# Patient Record
Sex: MALE | Race: WHITE | NOT HISPANIC OR LATINO | Employment: OTHER | URBAN - METROPOLITAN AREA
[De-identification: names, ages, dates, MRNs, and addresses within clinical notes are randomized per-mention and may not be internally consistent; named-entity substitution may affect disease eponyms.]

---

## 2018-08-15 ENCOUNTER — OFFICE VISIT CONVERTED (OUTPATIENT)
Dept: SURGERY | Facility: CLINIC | Age: 58
End: 2018-08-15
Attending: NURSE PRACTITIONER

## 2019-11-14 ENCOUNTER — OFFICE VISIT (OUTPATIENT)
Dept: FAMILY MEDICINE CLINIC | Facility: CLINIC | Age: 59
End: 2019-11-14

## 2019-11-14 VITALS
SYSTOLIC BLOOD PRESSURE: 131 MMHG | HEIGHT: 73 IN | WEIGHT: 260.8 LBS | RESPIRATION RATE: 18 BRPM | BODY MASS INDEX: 34.57 KG/M2 | DIASTOLIC BLOOD PRESSURE: 92 MMHG | TEMPERATURE: 98.1 F | HEART RATE: 65 BPM | OXYGEN SATURATION: 97 %

## 2019-11-14 DIAGNOSIS — R53.83 MALAISE AND FATIGUE: ICD-10-CM

## 2019-11-14 DIAGNOSIS — Z00.00 PHYSICAL EXAM: Primary | ICD-10-CM

## 2019-11-14 DIAGNOSIS — Z13.220 SCREENING FOR HYPERLIPIDEMIA: ICD-10-CM

## 2019-11-14 DIAGNOSIS — R51.9 FREQUENT HEADACHES: ICD-10-CM

## 2019-11-14 DIAGNOSIS — Z13.29 SCREENING FOR HYPOTHYROIDISM: ICD-10-CM

## 2019-11-14 DIAGNOSIS — R53.81 MALAISE AND FATIGUE: ICD-10-CM

## 2019-11-14 DIAGNOSIS — Z12.5 SCREENING FOR PROSTATE CANCER: ICD-10-CM

## 2019-11-14 PROCEDURE — 99386 PREV VISIT NEW AGE 40-64: CPT | Performed by: FAMILY MEDICINE

## 2019-11-14 RX ORDER — IBUPROFEN 800 MG/1
800 TABLET ORAL DAILY
COMMUNITY
End: 2020-01-17 | Stop reason: SDUPTHER

## 2019-11-14 RX ORDER — IBUPROFEN 200 MG
TABLET ORAL DAILY
COMMUNITY
End: 2019-11-14

## 2019-11-14 RX ORDER — SILDENAFIL CITRATE 20 MG/1
20 TABLET ORAL DAILY PRN
Refills: 5 | COMMUNITY
Start: 2019-11-11 | End: 2021-12-01 | Stop reason: SDUPTHER

## 2019-11-14 NOTE — PROGRESS NOTES
Chief Complaint   Patient presents with   • Annual Exam   • Headache       History of Present Illness:  Subjective   Kip Oliveira is a 59 y.o. male here for his annual physical with me. Kip is here for coordination of medical care, to discuss health maintenance, disease prevention as well as to followup on medical problems. Patient is here today as a new patient physical. Patient's last CPE was Unknown . Activity level is moderate. 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 Appetite is good. He has been on the KETO diet since 2018.Feels well with few complaints. Energy level is fair. He states that he is fatigues some and he states that there are times he has less energy. Sleeps well. Patient's last colonoscopy was 9 years ago and he had few pollups. He is advised to repeat in 10 years.  Patient is doing routine self skin exam monthly. Patient states that he has also been dealing with some headaches. He states that he has been having a lot of stress in his life here lately and he states that he has a lot going on. 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 He states that the headaches he is getting is daily and he states that worse in the last 3-4 weeks. He states that he had Liassic surgery years ago and he states that he has not seen a eye doctor in 13-14 years and he states that he can tell his vision has changed some. He states that besides these things he feels he is a healthy man.      Allergies:  Allergies   Allergen Reactions   • Sulfa Antibiotics Anaphylaxis     High fevers and heart issues        • Keflex [Cephalexin] Itching   • Motrin [Ibuprofen] Itching     Itchy throat          Social History:  Social History     Socioeconomic History   • Marital status:      Spouse name: Not on file   • Number of children: Not on file   • Years  of education: Not on file   • Highest education level: Not on file   Tobacco Use   • Smoking status: Never Smoker   • Smokeless tobacco: Never Used   Substance and Sexual Activity   • Alcohol use: No     Frequency: Never   • Drug use: No       Family History:  Family History   Problem Relation Age of Onset   • Cancer Mother    • Cancer Father        Past Medical History :  Active Ambulatory Problems     Diagnosis Date Noted   • Physical exam 11/14/2019   • Frequent headaches 11/15/2019     Resolved Ambulatory Problems     Diagnosis Date Noted   • No Resolved Ambulatory Problems     Past Medical History:   Diagnosis Date   • GERD (gastroesophageal reflux disease)    • Hyperlipidemia        Medication List:    Current Outpatient Medications:   •  ibuprofen (ADVIL,MOTRIN) 800 MG tablet, Take 800 mg by mouth Daily., Disp: , Rfl:   •  sildenafil (REVATIO) 20 MG tablet, Take 20 mg by mouth Daily As Needed., Disp: , Rfl: 5    Past Surgical History:  Past Surgical History:   Procedure Laterality Date   • COLONOSCOPY     • VENOUS ABLATION          The following portions of the patient's history were reviewed and updated as appropriate: allergies, current medications, past family history, past medical history, past social history, past surgical history and problem list.    Review Of Systems:  Review of Systems   Constitutional: Positive for fatigue. Negative for activity change and appetite change.   HENT: Negative for congestion, postnasal drip, sinus pressure and sore throat.    Eyes: Negative for blurred vision and itching.   Respiratory: Negative for cough, shortness of breath and wheezing.    Cardiovascular: Negative for chest pain.   Gastrointestinal: Negative for abdominal pain, constipation, nausea, vomiting and GERD.   Endocrine: Negative for cold intolerance and heat intolerance.   Genitourinary: Negative for difficulty urinating, dysuria and urinary incontinence.   Musculoskeletal: Negative for back pain, joint  "swelling and neck pain.   Skin: Negative for color change and rash.   Neurological: Negative for dizziness, speech difficulty, weakness and memory problem.   Psychiatric/Behavioral: Negative for behavioral problems, decreased concentration, suicidal ideas and depressed mood.       Physical Exam:  Vital Signs:  Vitals:    11/14/19 1434   BP: 131/92   Pulse: 65   Resp: 18   Temp: 98.1 °F (36.7 °C)   SpO2: 97%   Weight: 118 kg (260 lb 12.8 oz)   Height: 185.4 cm (73\")     Body mass index is 34.41 kg/m².    Physical Exam   Constitutional: He is oriented to person, place, and time. He appears well-developed and well-nourished. He is active.   HENT:   Head: Normocephalic and atraumatic.   Nose: Nose normal.   Eyes: Conjunctivae and lids are normal. Pupils are equal, round, and reactive to light.   Neck: Normal range of motion. Neck supple.   Cardiovascular: Normal rate, regular rhythm, normal heart sounds and normal pulses.   Pulmonary/Chest: Effort normal and breath sounds normal. No respiratory distress.   Abdominal: Soft. Bowel sounds are normal.   Musculoskeletal: Normal range of motion.   Neurological: He is alert and oriented to person, place, and time. He has normal strength and normal reflexes. He displays normal reflexes. No cranial nerve deficit or sensory deficit. Coordination normal.   Skin: Skin is warm and dry. Capillary refill takes less than 2 seconds.   Psychiatric: He has a normal mood and affect. His behavior is normal. Judgment and thought content normal.   Vitals reviewed.        Assessment and Plan:  Diagnoses and all orders for this visit:    1. Physical exam (Primary)  Assessment & Plan:  Discussed injury prevention, diet and exercise, safe sexual practices, and screening for common diseases. Encouraged use of sunscreen and seatbelts. Avoidance of tobacco encouraged. Limitation or avoidance of alcohol encouraged. Recommend yearly dental and eye exams. Also discussed monitoring of blood pressure, " lipids.      2. Frequent headaches  Assessment & Plan:  He takes NSAIDs to treat his symptoms.   He is currently stable.       3. Screening for hyperlipidemia  -     Cancel: CBC Auto Differential  -     Cancel: Lipid Panel With / Chol / HDL Ratio  -     Cancel: Comprehensive Metabolic Panel  -     Comprehensive Metabolic Panel; Future  -     Lipid Panel With / Chol / HDL Ratio; Future  -     CBC Auto Differential; Future    4. Screening for prostate cancer  -     Cancel: PSA Screen  -     PSA Screen; Future    5. Screening for hypothyroidism  -     Cancel: TSH  -     TSH; Future    6. Malaise and fatigue  -     Testosterone (Free & Total), LC / MS; Future  -     Vitamin D 25 Hydroxy; Future  -     Vitamin B12; Future

## 2019-11-15 PROBLEM — R51.9 FREQUENT HEADACHES: Status: ACTIVE | Noted: 2019-11-15

## 2019-11-21 ENCOUNTER — RESULTS ENCOUNTER (OUTPATIENT)
Dept: FAMILY MEDICINE CLINIC | Facility: CLINIC | Age: 59
End: 2019-11-21

## 2019-11-21 DIAGNOSIS — Z13.29 SCREENING FOR HYPOTHYROIDISM: ICD-10-CM

## 2019-11-21 DIAGNOSIS — Z12.5 SCREENING FOR PROSTATE CANCER: ICD-10-CM

## 2019-11-21 DIAGNOSIS — Z13.220 SCREENING FOR HYPERLIPIDEMIA: ICD-10-CM

## 2019-11-21 DIAGNOSIS — R53.83 MALAISE AND FATIGUE: ICD-10-CM

## 2019-11-21 DIAGNOSIS — R53.81 MALAISE AND FATIGUE: ICD-10-CM

## 2020-01-06 LAB
25(OH)D3+25(OH)D2 SERPL-MCNC: 25.1 NG/ML (ref 30–100)
ALBUMIN SERPL-MCNC: 4.1 G/DL (ref 3.5–5.5)
ALBUMIN/GLOB SERPL: 1.5 {RATIO} (ref 1.2–2.2)
ALP SERPL-CCNC: 98 IU/L (ref 39–117)
ALT SERPL-CCNC: 22 IU/L (ref 0–44)
AST SERPL-CCNC: 20 IU/L (ref 0–40)
BASOPHILS # BLD AUTO: 0.1 X10E3/UL (ref 0–0.2)
BASOPHILS NFR BLD AUTO: 1 %
BILIRUB SERPL-MCNC: 0.5 MG/DL (ref 0–1.2)
BUN SERPL-MCNC: 21 MG/DL (ref 6–24)
BUN/CREAT SERPL: 22 (ref 9–20)
CALCIUM SERPL-MCNC: 9.5 MG/DL (ref 8.7–10.2)
CHLORIDE SERPL-SCNC: 100 MMOL/L (ref 96–106)
CHOLEST SERPL-MCNC: 399 MG/DL (ref 100–199)
CHOLEST/HDLC SERPL: 5.8 RATIO (ref 0–5)
CO2 SERPL-SCNC: 25 MMOL/L (ref 20–29)
CREAT SERPL-MCNC: 0.97 MG/DL (ref 0.76–1.27)
EOSINOPHIL # BLD AUTO: 0.2 X10E3/UL (ref 0–0.4)
EOSINOPHIL NFR BLD AUTO: 3 %
ERYTHROCYTE [DISTWIDTH] IN BLOOD BY AUTOMATED COUNT: 14.1 % (ref 12.3–15.4)
GLOBULIN SER CALC-MCNC: 2.8 G/DL (ref 1.5–4.5)
GLUCOSE SERPL-MCNC: 97 MG/DL (ref 65–99)
HCT VFR BLD AUTO: 42.6 % (ref 37.5–51)
HDLC SERPL-MCNC: 69 MG/DL
HGB BLD-MCNC: 14.6 G/DL (ref 13–17.7)
IMM GRANULOCYTES # BLD AUTO: 0 X10E3/UL (ref 0–0.1)
IMM GRANULOCYTES NFR BLD AUTO: 0 %
LABORATORY COMMENT REPORT: ABNORMAL
LDLC SERPL CALC-MCNC: 311 MG/DL (ref 0–99)
LYMPHOCYTES # BLD AUTO: 2.8 X10E3/UL (ref 0.7–3.1)
LYMPHOCYTES NFR BLD AUTO: 40 %
MCH RBC QN AUTO: 30.7 PG (ref 26.6–33)
MCHC RBC AUTO-ENTMCNC: 34.3 G/DL (ref 31.5–35.7)
MCV RBC AUTO: 90 FL (ref 79–97)
MONOCYTES # BLD AUTO: 0.6 X10E3/UL (ref 0.1–0.9)
MONOCYTES NFR BLD AUTO: 8 %
NEUTROPHILS # BLD AUTO: 3.3 X10E3/UL (ref 1.4–7)
NEUTROPHILS NFR BLD AUTO: 48 %
PLATELET # BLD AUTO: 275 X10E3/UL (ref 150–450)
POTASSIUM SERPL-SCNC: 4.8 MMOL/L (ref 3.5–5.2)
PROT SERPL-MCNC: 6.9 G/DL (ref 6–8.5)
PSA SERPL-MCNC: 0.6 NG/ML (ref 0–4)
RBC # BLD AUTO: 4.76 X10E6/UL (ref 4.14–5.8)
SODIUM SERPL-SCNC: 137 MMOL/L (ref 134–144)
TESTOST FREE SERPL-MCNC: 9.6 PG/ML (ref 7.2–24)
TESTOST SERPL-MCNC: 718.8 NG/DL (ref 264–916)
TRIGL SERPL-MCNC: 93 MG/DL (ref 0–149)
TSH SERPL DL<=0.005 MIU/L-ACNC: 2.04 UIU/ML (ref 0.45–4.5)
VIT B12 SERPL-MCNC: 817 PG/ML (ref 232–1245)
VLDLC SERPL CALC-MCNC: 19 MG/DL (ref 5–40)
WBC # BLD AUTO: 7 X10E3/UL (ref 3.4–10.8)

## 2020-01-09 ENCOUNTER — TELEPHONE (OUTPATIENT)
Dept: FAMILY MEDICINE CLINIC | Facility: CLINIC | Age: 60
End: 2020-01-09

## 2020-01-09 NOTE — TELEPHONE ENCOUNTER
I called the patient and left a voicemail for him to call and get an appointment with us for  A lab follow up due to elevated Lipid panel.     He needs to schedule a follow up when he calls back.

## 2020-01-13 ENCOUNTER — OFFICE VISIT (OUTPATIENT)
Dept: FAMILY MEDICINE CLINIC | Facility: CLINIC | Age: 60
End: 2020-01-13

## 2020-01-13 VITALS
RESPIRATION RATE: 18 BRPM | WEIGHT: 267.4 LBS | HEIGHT: 73 IN | TEMPERATURE: 98.2 F | BODY MASS INDEX: 35.44 KG/M2 | SYSTOLIC BLOOD PRESSURE: 131 MMHG | OXYGEN SATURATION: 98 % | HEART RATE: 63 BPM | DIASTOLIC BLOOD PRESSURE: 77 MMHG

## 2020-01-13 DIAGNOSIS — E55.9 VITAMIN D DEFICIENCY: ICD-10-CM

## 2020-01-13 DIAGNOSIS — E78.2 MIXED HYPERLIPIDEMIA: Primary | ICD-10-CM

## 2020-01-13 PROBLEM — I47.1 SUPRAVENTRICULAR TACHYCARDIA (HCC): Status: ACTIVE | Noted: 2020-01-13

## 2020-01-13 PROBLEM — I47.10 SUPRAVENTRICULAR TACHYCARDIA: Status: ACTIVE | Noted: 2020-01-13

## 2020-01-13 PROBLEM — E78.5 HYPERLIPIDEMIA: Status: ACTIVE | Noted: 2020-01-13

## 2020-01-13 PROCEDURE — 99214 OFFICE O/P EST MOD 30 MIN: CPT | Performed by: FAMILY MEDICINE

## 2020-01-13 NOTE — PROGRESS NOTES
Chief Complaint   Patient presents with   • Hyperlipidemia     Lab review    • Vitamin D Deficiency     Lab review        History of Present Illness:  Subjective   Kip Oliveira is a 59 y.o. male.   Hyperlipidemia   This is a chronic problem. The current episode started more than 1 year ago. The problem is uncontrolled. Recent lipid tests were reviewed and are high. He has no history of chronic renal disease, diabetes, hypothyroidism, liver disease, obesity or nephrotic syndrome. Factors aggravating his hyperlipidemia include thiazides. Pertinent negatives include no chest pain, focal sensory loss, focal weakness, leg pain, myalgias or shortness of breath. (Patient is here today and states that he has always had an issue with his Cholesterol as he states that he has always had higher readings but he states that he has quit taking the medication as he states that he could not find a medication that he could take that would not mess with his joints and make him hurt all over. He states that when he was taking the medication he was miserable and he would hurt all over. ) Current antihyperlipidemic treatment includes diet change and statins. The current treatment provides mild improvement of lipids. Risk factors for coronary artery disease include family history, male sex and obesity.        Allergies:  Allergies   Allergen Reactions   • Sulfa Antibiotics Anaphylaxis     High fevers and heart issues        • Naproxen Sodium Itching   • Keflex [Cephalexin] Itching   • Motrin [Ibuprofen] Itching     Itchy throat          Social History:  Social History     Socioeconomic History   • Marital status:      Spouse name: Not on file   • Number of children: Not on file   • Years of education: Not on file   • Highest education level: Not on file   Tobacco Use   • Smoking status: Never Smoker   • Smokeless tobacco: Never Used   Substance and Sexual Activity   • Alcohol use: No     Frequency: Never   • Drug use: No        Family History:  Family History   Problem Relation Age of Onset   • Cancer Mother    • Cancer Father        Past Medical History :  Active Ambulatory Problems     Diagnosis Date Noted   • Physical exam 11/14/2019   • Frequent headaches 11/15/2019   • Chest pain 01/29/2014   • Hyperlipidemia 01/13/2020   • Palpitations 01/29/2014   • Supraventricular tachycardia (CMS/HCC) 01/13/2020   • Vitamin D deficiency 01/13/2020     Resolved Ambulatory Problems     Diagnosis Date Noted   • No Resolved Ambulatory Problems     Past Medical History:   Diagnosis Date   • GERD (gastroesophageal reflux disease)        Medication List:    Current Outpatient Medications:   •  sildenafil (REVATIO) 20 MG tablet, Take 20 mg by mouth Daily As Needed., Disp: , Rfl: 5  •  ibuprofen (ADVIL,MOTRIN) 800 MG tablet, Take 1 tablet by mouth Every 8 (Eight) Hours As Needed for Mild Pain ., Disp: 60 tablet, Rfl: 1    Past Surgical History:  Past Surgical History:   Procedure Laterality Date   • COLONOSCOPY     • VENOUS ABLATION          The following portions of the patient's history were reviewed and updated as appropriate: allergies, current medications, past family history, past medical history, past social history, past surgical history and problem list.    Review Of Systems:  Review of Systems   Constitutional: Negative for activity change, appetite change and fatigue.   HENT: Negative for congestion, postnasal drip, sinus pressure and sore throat.    Eyes: Negative for blurred vision and itching.   Respiratory: Negative for cough, shortness of breath and wheezing.    Cardiovascular: Negative for chest pain.   Gastrointestinal: Negative for abdominal pain, constipation, nausea, vomiting and GERD.   Endocrine: Negative for cold intolerance and heat intolerance.   Genitourinary: Negative for difficulty urinating, dysuria and urinary incontinence.   Musculoskeletal: Negative for back pain, joint swelling, myalgias and neck pain.   Skin:  "Negative for color change and rash.   Neurological: Negative for dizziness, focal weakness, speech difficulty, weakness and memory problem.   Psychiatric/Behavioral: Negative for behavioral problems, decreased concentration, suicidal ideas and depressed mood.       Physical Exam:  Vital Signs:  Vitals:    01/13/20 1518   BP: 131/77   Pulse: 63   Resp: 18   Temp: 98.2 °F (36.8 °C)   SpO2: 98%   Weight: 121 kg (267 lb 6.4 oz)   Height: 185.4 cm (73\")     Body mass index is 35.28 kg/m².    Physical Exam   Constitutional: He is oriented to person, place, and time. He appears well-developed and well-nourished.   HENT:   Head: Normocephalic.   Right Ear: External ear normal.   Left Ear: External ear normal.   Nose: Nose normal.   Eyes: Conjunctivae are normal.   Neck: Normal range of motion. Neck supple.   Cardiovascular: Normal rate and regular rhythm.   Pulmonary/Chest: Effort normal and breath sounds normal.   Musculoskeletal: Normal range of motion.   Neurological: He is alert and oriented to person, place, and time.   Skin: Skin is warm and dry. Capillary refill takes less than 2 seconds.   Vitals reviewed.      Recent Results (from the past 1344 hour(s))   CBC & Differential    Collection Time: 01/02/20  8:16 AM   Result Value Ref Range    WBC 7.0 3.4 - 10.8 x10E3/uL    RBC 4.76 4.14 - 5.80 x10E6/uL    Hemoglobin 14.6 13.0 - 17.7 g/dL    Hematocrit 42.6 37.5 - 51.0 %    MCV 90 79 - 97 fL    MCH 30.7 26.6 - 33.0 pg    MCHC 34.3 31.5 - 35.7 g/dL    RDW 14.1 12.3 - 15.4 %    Platelets 275 150 - 450 x10E3/uL    Neutrophil Rel % 48 Not Estab. %    Lymphocyte Rel % 40 Not Estab. %    Monocyte Rel % 8 Not Estab. %    Eosinophil Rel % 3 Not Estab. %    Basophil Rel % 1 Not Estab. %    Neutrophils Absolute 3.3 1.4 - 7.0 x10E3/uL    Lymphocytes Absolute 2.8 0.7 - 3.1 x10E3/uL    Monocytes Absolute 0.6 0.1 - 0.9 x10E3/uL    Eosinophils Absolute 0.2 0.0 - 0.4 x10E3/uL    Basophils Absolute 0.1 0.0 - 0.2 x10E3/uL    Immature " Granulocyte Rel % 0 Not Estab. %    Immature Grans Absolute 0.0 0.0 - 0.1 x10E3/uL   Comprehensive Metabolic Panel    Collection Time: 01/02/20  8:16 AM   Result Value Ref Range    Glucose 97 65 - 99 mg/dL    BUN 21 6 - 24 mg/dL    Creatinine 0.97 0.76 - 1.27 mg/dL    eGFR Non African Am 85 >59 mL/min/1.73    eGFR African Am 98 >59 mL/min/1.73    BUN/Creatinine Ratio 22 (H) 9 - 20    Sodium 137 134 - 144 mmol/L    Potassium 4.8 3.5 - 5.2 mmol/L    Chloride 100 96 - 106 mmol/L    Total CO2 25 20 - 29 mmol/L    Calcium 9.5 8.7 - 10.2 mg/dL    Total Protein 6.9 6.0 - 8.5 g/dL    Albumin 4.1 3.5 - 5.5 g/dL    Globulin 2.8 1.5 - 4.5 g/dL    A/G Ratio 1.5 1.2 - 2.2    Total Bilirubin 0.5 0.0 - 1.2 mg/dL    Alkaline Phosphatase 98 39 - 117 IU/L    AST (SGOT) 20 0 - 40 IU/L    ALT (SGPT) 22 0 - 44 IU/L   Lipid Panel With / Chol / HDL Ratio    Collection Time: 01/02/20  8:16 AM   Result Value Ref Range    Total Cholesterol 399 (H) 100 - 199 mg/dL    Triglycerides 93 0 - 149 mg/dL    HDL Cholesterol 69 >39 mg/dL    VLDL Cholesterol 19 5 - 40 mg/dL    LDL Cholesterol  311 (H) 0 - 99 mg/dL    Comment Comment     Chol/HDL Ratio 5.8 (H) 0.0 - 5.0 ratio   Testosterone (Free & Total), LC / MS    Collection Time: 01/02/20  8:16 AM   Result Value Ref Range    Testosterone, Total 718.8 264.0 - 916.0 ng/dL    Testosterone, Free 9.6 7.2 - 24.0 pg/mL   TSH    Collection Time: 01/02/20  8:16 AM   Result Value Ref Range    TSH 2.040 0.450 - 4.500 uIU/mL   PSA DIAGNOSTIC    Collection Time: 01/02/20  8:16 AM   Result Value Ref Range    PSA 0.6 0.0 - 4.0 ng/mL   Vitamin D 25 Hydroxy    Collection Time: 01/02/20  8:16 AM   Result Value Ref Range    25 Hydroxy, Vitamin D 25.1 (L) 30.0 - 100.0 ng/mL   Vitamin B12    Collection Time: 01/02/20  8:16 AM   Result Value Ref Range    Vitamin B-12 817 232 - 1,245 pg/mL       Assessment and Plan:  Diagnoses and all orders for this visit:    1. Mixed hyperlipidemia (Primary)  Assessment & Plan:  Lipid  abnormalities are worsening.  Nutritional counseling was provided. and Pharmacotherapy as ordered.  Lipids will be reassessed in 3 months.    Orders:  -     Lipid panel; Future    2. Vitamin D deficiency  Assessment & Plan:  Patient was advised to use over-the-counter vitamin D 5000 units.  We will recheck in 3 months.

## 2020-01-14 ENCOUNTER — RESULTS ENCOUNTER (OUTPATIENT)
Dept: FAMILY MEDICINE CLINIC | Facility: CLINIC | Age: 60
End: 2020-01-14

## 2020-01-14 DIAGNOSIS — E78.2 MIXED HYPERLIPIDEMIA: ICD-10-CM

## 2020-01-16 ENCOUNTER — TELEPHONE (OUTPATIENT)
Dept: FAMILY MEDICINE CLINIC | Facility: CLINIC | Age: 60
End: 2020-01-16

## 2020-01-16 DIAGNOSIS — R51.9 FREQUENT HEADACHES: Primary | ICD-10-CM

## 2020-01-16 RX ORDER — IBUPROFEN 800 MG/1
800 TABLET ORAL DAILY
Status: CANCELLED | OUTPATIENT
Start: 2020-01-16

## 2020-01-17 ENCOUNTER — TELEPHONE (OUTPATIENT)
Dept: FAMILY MEDICINE CLINIC | Facility: CLINIC | Age: 60
End: 2020-01-17

## 2020-01-17 RX ORDER — IBUPROFEN 800 MG/1
800 TABLET ORAL EVERY 8 HOURS PRN
Qty: 60 TABLET | Refills: 1 | Status: SHIPPED | OUTPATIENT
Start: 2020-01-17 | End: 2020-05-11

## 2020-05-10 DIAGNOSIS — R51.9 FREQUENT HEADACHES: ICD-10-CM

## 2020-05-11 RX ORDER — IBUPROFEN 800 MG/1
TABLET ORAL
Qty: 60 TABLET | Refills: 1 | Status: SHIPPED | OUTPATIENT
Start: 2020-05-11 | End: 2022-09-21 | Stop reason: SDUPTHER

## 2021-05-16 VITALS — WEIGHT: 271 LBS | BODY MASS INDEX: 37.94 KG/M2 | RESPIRATION RATE: 16 BRPM | HEIGHT: 71 IN

## 2021-08-17 ENCOUNTER — OFFICE VISIT (OUTPATIENT)
Dept: FAMILY MEDICINE CLINIC | Facility: CLINIC | Age: 61
End: 2021-08-17

## 2021-08-17 VITALS
BODY MASS INDEX: 40.88 KG/M2 | OXYGEN SATURATION: 94 % | WEIGHT: 276 LBS | HEIGHT: 69 IN | HEART RATE: 71 BPM | DIASTOLIC BLOOD PRESSURE: 90 MMHG | TEMPERATURE: 97 F | SYSTOLIC BLOOD PRESSURE: 164 MMHG

## 2021-08-17 DIAGNOSIS — Z12.11 SCREENING FOR COLON CANCER: ICD-10-CM

## 2021-08-17 DIAGNOSIS — B35.4 TINEA CORPORIS: Primary | ICD-10-CM

## 2021-08-17 DIAGNOSIS — E78.2 MIXED HYPERLIPIDEMIA: ICD-10-CM

## 2021-08-17 DIAGNOSIS — R03.0 ELEVATED BLOOD PRESSURE READING IN OFFICE WITHOUT DIAGNOSIS OF HYPERTENSION: ICD-10-CM

## 2021-08-17 PROBLEM — Z87.39 HISTORY OF GOUT: Status: ACTIVE | Noted: 2021-08-17

## 2021-08-17 PROBLEM — Z00.00 PHYSICAL EXAM: Status: RESOLVED | Noted: 2019-11-14 | Resolved: 2021-08-17

## 2021-08-17 PROCEDURE — 99204 OFFICE O/P NEW MOD 45 MIN: CPT | Performed by: NURSE PRACTITIONER

## 2021-08-17 RX ORDER — EZETIMIBE 10 MG/1
10 TABLET ORAL DAILY
Qty: 90 TABLET | Refills: 1 | Status: SHIPPED | OUTPATIENT
Start: 2021-08-17 | End: 2022-03-01 | Stop reason: SDUPTHER

## 2021-08-17 RX ORDER — PANTOPRAZOLE SODIUM 40 MG/1
40 TABLET, DELAYED RELEASE ORAL DAILY
COMMUNITY
Start: 2021-06-16 | End: 2022-09-19 | Stop reason: SDUPTHER

## 2021-08-17 RX ORDER — TERBINAFINE HYDROCHLORIDE 250 MG/1
250 TABLET ORAL DAILY
Qty: 14 TABLET | Refills: 0 | Status: SHIPPED | OUTPATIENT
Start: 2021-08-17 | End: 2021-08-31 | Stop reason: SDUPTHER

## 2021-08-17 RX ORDER — ALLOPURINOL 100 MG/1
100 TABLET ORAL DAILY
COMMUNITY
Start: 2021-06-24 | End: 2022-09-19 | Stop reason: SDUPTHER

## 2021-08-17 RX ORDER — TERBINAFINE HYDROCHLORIDE 250 MG/1
250 TABLET ORAL DAILY
Qty: 14 TABLET | Refills: 0 | Status: SHIPPED | OUTPATIENT
Start: 2021-08-17 | End: 2021-08-17

## 2021-08-17 NOTE — PROGRESS NOTES
Chief Complaint  Rash (thinks it might be ring worm )    Subjective            Kip Oliveira presents to [facility]  History of Present Illness     Establish care.  His current PCP is Dr. Roblero in [place], [place].  Prior to that it was Dr. Willis, who is now retired.    He reports for the past 3 to 4 weeks he has been using several over-the-counter antifungal creams to treat what he suspects is ringworm on the inner aspect of his left thigh.  Despite using the over-the-counter medications several times per day the area is worsening.  He did bring in pictures of the medications that he has been using, which include clotrimazole, Lamisil, miconazole, and Tinactin.    He has noticed similar rashes sporadically on the inner aspect of his left upper extremity, and on the left hand.  Neither of those are present today.    Blood pressure is elevated on exam today at 164/90.  Patient denies any history of hypertension.  He states his blood pressure is normally very good.  He states he has lost approximately 80 pounds following a keto diet.  His only health issue, in his opinion, is his cholesterol.  He last had labs with his PCP in June, and brought a copy of them with him today.  CBC and CMP are unremarkable.  His lipids do show an elevated cholesterol and LDL.  He is intolerant to statins, noting that they cause him muscle cramping.  He is tolerant of Zetia but notes that it is expensive through his insurance.  He has not tried getting it filled with good Rx.  He currently denies any chest pain or palpitations.  No shortness of breath or wheezing.  No swelling in the legs.    He last had a colonoscopy approximately 10 to 11 years ago.  After having the colonoscopy he had trouble with hemorrhoids for almost 6 years.  He states his colonoscopy was normal and they recommended a 10-year recall.  He does not wish to have a colonoscopy at this time, but [facility] is [facility]  Currently  "denies any constipation, diarrhea, rectal bleeding, or blood in his stool.    Past Medical History:   Diagnosis Date   • GERD (gastroesophageal reflux disease)    • Hyperlipidemia        Allergies   Allergen Reactions   • Sulfa Antibiotics Anaphylaxis     High fevers and heart issues        • Naproxen Sodium Itching   • Keflex [Cephalexin] Itching        Past Surgical History:   Procedure Laterality Date   • COLONOSCOPY     • VENOUS ABLATION          Social History     Tobacco Use   • Smoking status: Never Smoker   • Smokeless tobacco: Never Used   Substance Use Topics   • Alcohol use: No   • Drug use: No       Family History   Problem Relation Age of Onset   • Cancer Mother    • Cancer Father         Health Maintenance Due   Topic Date Due   • COLORECTAL CANCER SCREENING  Never done   • COVID-19 Vaccine (1) Never done   • TDAP/TD VACCINES (1 - Tdap) Never done   • ZOSTER VACCINE (1 of 2) Never done   • HEPATITIS C SCREENING  Never done   • ANNUAL PHYSICAL  11/15/2020        Current Outpatient Medications on File Prior to Visit   Medication Sig   • allopurinol (ZYLOPRIM) 100 MG tablet Take 100 mg by mouth Daily.   • ibuprofen (ADVIL,MOTRIN) 800 MG tablet TAKE ONE TABLET BY MOUTH EVERY 8 HOURS AS NEEDED FOR MILD PAIN   • pantoprazole (PROTONIX) 40 MG EC tablet Take 40 mg by mouth Daily.   • sildenafil (REVATIO) 20 MG tablet Take 20 mg by mouth Daily As Needed.     No current facility-administered medications on file prior to visit.       There is no immunization history on file for this patient.    Review of Systems     Objective     /90   Pulse 71   Temp 97 °F (36.1 °C)   Ht 175.3 cm (69\")   Wt 125 kg (276 lb)   SpO2 94%   BMI 40.76 kg/m²       Physical Exam  Vitals reviewed.   Constitutional:       General: He is not in acute distress.     Appearance: Normal appearance. He is well-developed. He is obese.   HENT:      Head: Normocephalic and atraumatic.   Eyes:      General: No scleral icterus.     " Conjunctiva/sclera: Conjunctivae normal.      Pupils: Pupils are equal, round, and reactive to light.   Neck:      Thyroid: No thyroid mass, thyromegaly or thyroid tenderness.      Vascular: No carotid bruit.      Trachea: Trachea normal.   Cardiovascular:      Rate and Rhythm: Normal rate and regular rhythm.      Pulses: Normal pulses.      Heart sounds: No murmur heard.     Pulmonary:      Effort: Pulmonary effort is normal.      Breath sounds: Normal breath sounds. No wheezing or rhonchi.   Musculoskeletal:         General: Normal range of motion.      Cervical back: Normal range of motion and neck supple.      Right lower leg: No edema.      Left lower leg: No edema.   Lymphadenopathy:      Cervical: No cervical adenopathy.   Skin:     General: Skin is warm and dry.      Comments: On the inner aspect of the left thigh there is a 110mm x 110mm area of erythema noted with active border - there is another area of erythema and active border within this area, which measures approx. 60mm x 60mm - There is central clearing present as well.    Neurological:      Mental Status: He is alert and oriented to person, place, and time.   Psychiatric:         Mood and Affect: Mood and affect normal.         Behavior: Behavior normal.         Thought Content: Thought content normal.         Judgment: Judgment normal.       Result Review :     The following data was reviewed by: GABI Echeverria on 08/17/2021:    Outside labs 6/17/2021:  CBC - NL  CMP - NL, including LFTs  Lipids - abnl - cholesterol 435, Trig 109, HDL 66,   Hgb A1C 5.5%  PSA normal   Uric acid normal                  Assessment and Plan      Diagnoses and all orders for this visit:    1. Tinea corporis (Primary)  -     Discontinue: terbinafine (lamiSIL) 250 MG tablet; Take 1 tablet by mouth Daily.  Dispense: 14 tablet; Refill: 0  -     terbinafine (lamiSIL) 250 MG tablet; Take 1 tablet by mouth Daily.  Dispense: 14 tablet; Refill: 0    2.  Screening for colon cancer  -     Cologuard - Stool, Per Rectum; Future    3. Mixed hyperlipidemia  -     ezetimibe (ZETIA) 10 MG tablet; Take 1 tablet by mouth Daily.  Dispense: 90 tablet; Refill: 1    4. Elevated blood pressure reading in office without diagnosis of hypertension        Follow Up     Return in about 3 months (around 11/17/2021) for recheck - CMP/fasting lipid profile - follow up Zetia.  Encouraged annual physical exam.     Follow up sooner if no improvement with oral antifungal.     Patient was given instructions and counseling regarding his condition or for health maintenance advice. Please see specific information pulled into the AVS if appropriate.     Kip Oliveira  reports that he has never smoked. He has never used smokeless tobacco.

## 2021-08-17 NOTE — PATIENT INSTRUCTIONS
Body Ringworm  Body ringworm is an infection of the skin that often causes a ring-shaped rash. Body ringworm is also called tinea corporis.  Body ringworm can affect any part of your skin. This condition is easily spread from person to person (is very contagious).  What are the causes?  This condition is caused by fungi called dermatophytes. The condition develops when these fungi grow out of control on the skin.  You can get this condition if you touch a person or animal that has it. You can also get it if you share any items with an infected person or pet. These include:  · Clothing, bedding, and towels.  · Brushes or cervantes.  · Gym equipment.  · Any other object that has the fungus on it.  What increases the risk?  You are more likely to develop this condition if you:  · Play sports that involve close physical contact, such as wrestling.  · Sweat a lot.  · Live in areas that are hot and humid.  · Use public showers.  · Have a weakened immune system.  What are the signs or symptoms?  Symptoms of this condition include:  · Itchy, raised red spots and bumps.  · Red scaly patches.  · A ring-shaped rash. The rash may have:  ? A clear center.  ? Scales or red bumps at its center.  ? Redness near its borders.  ? Dry and scaly skin on or around it.  How is this diagnosed?  This condition can usually be diagnosed with a skin exam. A skin scraping may be taken from the affected area and examined under a microscope to see if the fungus is present.  How is this treated?  This condition may be treated with:  · An antifungal cream or ointment.  · An antifungal shampoo.  · Antifungal medicines. These may be prescribed if your ringworm:  ? Is severe.  ? Keeps coming back.  ? Lasts a long time.  Follow these instructions at home:  · Take over-the-counter and prescription medicines only as told by your health care provider.  · If you were given an antifungal cream or ointment:  ? Use it as told by your health care provider.  ? Wash  the infected area and dry it completely before applying the cream or ointment.  · If you were given an antifungal shampoo:  ? Use it as told by your health care provider.  ? Leave the shampoo on your body for 3-5 minutes before rinsing.  · While you have a rash:  ? Wear loose clothing to stop clothes from rubbing and irritating it.  ? Wash or change your bed sheets every night.  ? Disinfect or throw out items that may be infected.  ? Wash clothes and bed sheets in hot water.  ? Wash your hands often with soap and water. If soap and water are not available, use hand .  · If your pet has the same infection, take your pet to see a  for treatment.  How is this prevented?  · Take a bath or shower every day and after every time you work out or play sports.  · Dry your skin completely after bathing.  · Wear sandals or shoes in public places and showers.  · Change your clothes every day.  · Wash athletic clothes after each use.  · Do not share personal items with others.  · Avoid touching red patches of skin on other people.  · Avoid touching pets that have bald spots.  · If you touch an animal that has a bald spot, wash your hands.  Contact a health care provider if:  · Your rash continues to spread after 7 days of treatment.  · Your rash is not gone in 4 weeks.  · The area around your rash gets red, warm, tender, and swollen.  Summary  · Body ringworm is an infection of the skin that often causes a ring-shaped rash.  · This condition is easily spread from person to person (is very contagious).  · This condition may be treated with antifungal cream or ointment, antifungal shampoo, or antifungal medicines.  · Take over-the-counter and prescription medicines only as told by your health care provider.  This information is not intended to replace advice given to you by your health care provider. Make sure you discuss any questions you have with your health care provider.  Document Revised: 08/16/2019  Document Reviewed: 08/16/2019  [employer] Patient Education © 2021 [employer]

## 2021-08-31 ENCOUNTER — TELEPHONE (OUTPATIENT)
Dept: FAMILY MEDICINE CLINIC | Facility: CLINIC | Age: 61
End: 2021-08-31

## 2021-08-31 DIAGNOSIS — B35.4 TINEA CORPORIS: ICD-10-CM

## 2021-08-31 RX ORDER — TERBINAFINE HYDROCHLORIDE 250 MG/1
250 TABLET ORAL DAILY
Qty: 14 TABLET | Refills: 0 | OUTPATIENT
Start: 2021-08-31

## 2021-08-31 RX ORDER — TERBINAFINE HYDROCHLORIDE 250 MG/1
250 TABLET ORAL DAILY
Qty: 14 TABLET | Refills: 0 | Status: SHIPPED | OUTPATIENT
Start: 2021-08-31 | End: 2021-09-14 | Stop reason: SDUPTHER

## 2021-08-31 NOTE — TELEPHONE ENCOUNTER
Pt called to get an appt but couldn't get into see you until the 8th.  He said that it is getting lighter in color but the spot is getting bigger.

## 2021-09-13 ENCOUNTER — OFFICE VISIT (OUTPATIENT)
Dept: FAMILY MEDICINE CLINIC | Facility: CLINIC | Age: 61
End: 2021-09-13

## 2021-09-13 VITALS
TEMPERATURE: 97.2 F | OXYGEN SATURATION: 99 % | BODY MASS INDEX: 40.76 KG/M2 | SYSTOLIC BLOOD PRESSURE: 142 MMHG | HEART RATE: 88 BPM | DIASTOLIC BLOOD PRESSURE: 80 MMHG | WEIGHT: 276 LBS

## 2021-09-13 DIAGNOSIS — B35.4 TINEA CORPORIS: Primary | ICD-10-CM

## 2021-09-13 LAB
ALBUMIN SERPL-MCNC: 4.4 G/DL (ref 3.5–5.2)
ALP SERPL-CCNC: 110 U/L (ref 39–117)
ALT SERPL W P-5'-P-CCNC: 22 U/L (ref 1–41)
AST SERPL-CCNC: 18 U/L (ref 1–40)
BILIRUB CONJ SERPL-MCNC: <0.2 MG/DL (ref 0–0.3)
BILIRUB INDIRECT SERPL-MCNC: NORMAL MG/DL
BILIRUB SERPL-MCNC: 0.2 MG/DL (ref 0–1.2)
PROT SERPL-MCNC: 7.1 G/DL (ref 6–8.5)

## 2021-09-13 PROCEDURE — 99213 OFFICE O/P EST LOW 20 MIN: CPT | Performed by: NURSE PRACTITIONER

## 2021-09-13 PROCEDURE — 80076 HEPATIC FUNCTION PANEL: CPT | Performed by: NURSE PRACTITIONER

## 2021-09-13 NOTE — PROGRESS NOTES
Chief Complaint  Rash (follow up on rash )    Subjective            Kip Oliveira presents to [facility]  History of Present Illness     Patient presents today to follow-up on tinea infection of the left medial thigh.  He is on his second 2-week course of terbinafine, and he is also using a combination of clotrimazole and hydrocortisone cream over-the-counter.  The rash is approximately 90% improved.  Erythema has resolved completely.  There is no pruritus present.  He reports very faint circular area of dry skin remaining.    Past Medical History:   Diagnosis Date   • GERD (gastroesophageal reflux disease)    • Hyperlipidemia        Allergies   Allergen Reactions   • Sulfa Antibiotics Anaphylaxis     High fevers and heart issues        • Naproxen Sodium Itching   • Keflex [Cephalexin] Itching        Past Surgical History:   Procedure Laterality Date   • COLONOSCOPY     • VENOUS ABLATION          Social History     Tobacco Use   • Smoking status: Never Smoker   • Smokeless tobacco: Never Used   Substance Use Topics   • Alcohol use: No   • Drug use: No       Family History   Problem Relation Age of Onset   • Cancer Mother    • Cancer Father         Health Maintenance Due   Topic Date Due   • COVID-19 Vaccine (1) Never done   • TDAP/TD VACCINES (1 - Tdap) Never done   • ZOSTER VACCINE (1 of 2) Never done   • HEPATITIS C SCREENING  Never done   • ANNUAL PHYSICAL  11/15/2020        Current Outpatient Medications on File Prior to Visit   Medication Sig   • allopurinol (ZYLOPRIM) 100 MG tablet Take 100 mg by mouth Daily.   • ezetimibe (ZETIA) 10 MG tablet Take 1 tablet by mouth Daily.   • ibuprofen (ADVIL,MOTRIN) 800 MG tablet TAKE ONE TABLET BY MOUTH EVERY 8 HOURS AS NEEDED FOR MILD PAIN   • pantoprazole (PROTONIX) 40 MG EC tablet Take 40 mg by mouth Daily.   • sildenafil (REVATIO) 20 MG tablet Take 20 mg by mouth Daily As Needed.   • terbinafine (lamiSIL) 250 MG tablet Take 1 tablet by  mouth Daily.     No current facility-administered medications on file prior to visit.         There is no immunization history on file for this patient.    Review of Systems     Objective     /80   Pulse 88   Temp 97.2 °F (36.2 °C)   Wt 125 kg (276 lb)   SpO2 99%   BMI 40.76 kg/m²       Physical Exam  Vitals reviewed.   Constitutional:       General: He is not in acute distress.     Appearance: Normal appearance. He is well-developed. He is obese.   HENT:      Head: Normocephalic and atraumatic.   Eyes:      General: No scleral icterus.  Cardiovascular:      Rate and Rhythm: Normal rate and regular rhythm.      Pulses: Normal pulses.      Heart sounds: No murmur heard.     Pulmonary:      Effort: Pulmonary effort is normal.      Breath sounds: Normal breath sounds. No wheezing or rhonchi.   Musculoskeletal:         General: Normal range of motion.   Skin:     General: Skin is warm and dry.      Comments: Medial aspect of the left thigh with annular area of dry appearing skin with central clearing.  There is no erythema present there is no excoriation present no drainage or exudate present.   Neurological:      Mental Status: He is alert and oriented to person, place, and time.   Psychiatric:         Mood and Affect: Mood and affect normal.         Behavior: Behavior normal.         Thought Content: Thought content normal.         Judgment: Judgment normal.         Result Review :     The following data was reviewed by: GABI Echeverria on 09/13/2021:    SCANNED - LABS (08/17/2021)                  Assessment and Plan      Diagnoses and all orders for this visit:    1. Tinea corporis (Primary)  -     Hepatic Function Panel            Follow Up     Return if symptoms worsen or fail to improve.     Repeat LFTs today.  If within normal limits, then we will continue terbinafine for another 2 weeks, as well as topical combination of hydrocortisone cream and clotrimazole over-the-counter.  I suspect he  will see resolution following completion of treatment.    Patient was given instructions and counseling regarding his condition or for health maintenance advice. Please see specific information pulled into the AVS if appropriate.

## 2021-09-13 NOTE — PROGRESS NOTES
Venipuncture Blood Specimen Collection  Venipuncture performed in left arm  by Franchesca Jaime with good hemostasis. Patient tolerated the procedure well without complications.   09/13/21   Franchesca Jaime

## 2021-09-14 DIAGNOSIS — B35.4 TINEA CORPORIS: Primary | ICD-10-CM

## 2021-09-14 RX ORDER — TERBINAFINE HYDROCHLORIDE 250 MG/1
250 TABLET ORAL DAILY
Qty: 14 TABLET | Refills: 0 | Status: SHIPPED | OUTPATIENT
Start: 2021-09-14 | End: 2021-12-01

## 2021-12-01 ENCOUNTER — OFFICE VISIT (OUTPATIENT)
Dept: FAMILY MEDICINE CLINIC | Facility: CLINIC | Age: 61
End: 2021-12-01

## 2021-12-01 VITALS
WEIGHT: 281 LBS | DIASTOLIC BLOOD PRESSURE: 82 MMHG | SYSTOLIC BLOOD PRESSURE: 146 MMHG | OXYGEN SATURATION: 99 % | BODY MASS INDEX: 41.5 KG/M2 | TEMPERATURE: 96.8 F | HEART RATE: 76 BPM

## 2021-12-01 DIAGNOSIS — R23.3 EASY BRUISING: ICD-10-CM

## 2021-12-01 DIAGNOSIS — Z11.59 NEED FOR HEPATITIS C SCREENING TEST: ICD-10-CM

## 2021-12-01 DIAGNOSIS — K21.9 GASTROESOPHAGEAL REFLUX DISEASE WITHOUT ESOPHAGITIS: ICD-10-CM

## 2021-12-01 DIAGNOSIS — E78.2 MIXED HYPERLIPIDEMIA: ICD-10-CM

## 2021-12-01 DIAGNOSIS — N52.9 ERECTILE DYSFUNCTION, UNSPECIFIED ERECTILE DYSFUNCTION TYPE: Primary | ICD-10-CM

## 2021-12-01 DIAGNOSIS — M25.841 CYST OF JOINT OF RIGHT HAND: ICD-10-CM

## 2021-12-01 DIAGNOSIS — M1A.9XX0 CHRONIC GOUT WITHOUT TOPHUS, UNSPECIFIED CAUSE, UNSPECIFIED SITE: ICD-10-CM

## 2021-12-01 LAB
APTT PPP: 27.9 SECONDS (ref 22.2–34.2)
BASOPHILS # BLD AUTO: 0.15 10*3/MM3 (ref 0–0.2)
BASOPHILS NFR BLD AUTO: 1.5 % (ref 0–1.5)
DEPRECATED RDW RBC AUTO: 40.8 FL (ref 37–54)
EOSINOPHIL # BLD AUTO: 0.3 10*3/MM3 (ref 0–0.4)
EOSINOPHIL NFR BLD AUTO: 2.9 % (ref 0.3–6.2)
ERYTHROCYTE [DISTWIDTH] IN BLOOD BY AUTOMATED COUNT: 12.5 % (ref 12.3–15.4)
HCT VFR BLD AUTO: 42.2 % (ref 37.5–51)
HGB BLD-MCNC: 14.6 G/DL (ref 13–17.7)
IMM GRANULOCYTES # BLD AUTO: 0.03 10*3/MM3 (ref 0–0.05)
IMM GRANULOCYTES NFR BLD AUTO: 0.3 % (ref 0–0.5)
INR PPP: 0.94 (ref 2–3)
LYMPHOCYTES # BLD AUTO: 3.72 10*3/MM3 (ref 0.7–3.1)
LYMPHOCYTES NFR BLD AUTO: 36.5 % (ref 19.6–45.3)
MCH RBC QN AUTO: 31 PG (ref 26.6–33)
MCHC RBC AUTO-ENTMCNC: 34.6 G/DL (ref 31.5–35.7)
MCV RBC AUTO: 89.6 FL (ref 79–97)
MONOCYTES # BLD AUTO: 0.65 10*3/MM3 (ref 0.1–0.9)
MONOCYTES NFR BLD AUTO: 6.4 % (ref 5–12)
NEUTROPHILS NFR BLD AUTO: 5.33 10*3/MM3 (ref 1.7–7)
NEUTROPHILS NFR BLD AUTO: 52.4 % (ref 42.7–76)
NRBC BLD AUTO-RTO: 0 /100 WBC (ref 0–0.2)
PLATELET # BLD AUTO: 308 10*3/MM3 (ref 140–450)
PMV BLD AUTO: 10.1 FL (ref 6–12)
PROTHROMBIN TIME: 10 SECONDS (ref 9.4–12)
RBC # BLD AUTO: 4.71 10*6/MM3 (ref 4.14–5.8)
WBC NRBC COR # BLD: 10.18 10*3/MM3 (ref 3.4–10.8)

## 2021-12-01 PROCEDURE — 85025 COMPLETE CBC W/AUTO DIFF WBC: CPT | Performed by: NURSE PRACTITIONER

## 2021-12-01 PROCEDURE — 99214 OFFICE O/P EST MOD 30 MIN: CPT | Performed by: NURSE PRACTITIONER

## 2021-12-01 PROCEDURE — 85730 THROMBOPLASTIN TIME PARTIAL: CPT | Performed by: NURSE PRACTITIONER

## 2021-12-01 PROCEDURE — 85240 CLOT FACTOR VIII AHG 1 STAGE: CPT | Performed by: NURSE PRACTITIONER

## 2021-12-01 PROCEDURE — 86803 HEPATITIS C AB TEST: CPT | Performed by: NURSE PRACTITIONER

## 2021-12-01 PROCEDURE — 85246 CLOT FACTOR VIII VW ANTIGEN: CPT | Performed by: NURSE PRACTITIONER

## 2021-12-01 PROCEDURE — 85245 CLOT FACTOR VIII VW RISTOCTN: CPT | Performed by: NURSE PRACTITIONER

## 2021-12-01 PROCEDURE — 85610 PROTHROMBIN TIME: CPT | Performed by: NURSE PRACTITIONER

## 2021-12-01 RX ORDER — FLUTICASONE PROPIONATE 50 MCG
SPRAY, SUSPENSION (ML) NASAL
COMMUNITY
Start: 2021-11-21 | End: 2021-12-01

## 2021-12-01 RX ORDER — SILDENAFIL CITRATE 20 MG/1
TABLET ORAL
Qty: 40 TABLET | Refills: 2 | Status: SHIPPED | OUTPATIENT
Start: 2021-12-01 | End: 2022-09-19 | Stop reason: SDUPTHER

## 2021-12-01 NOTE — PROGRESS NOTES
Chief Complaint  Follow-up    Subjective            Kip Oliveira presents to [facility]  History of Present Illness     Follow-up    He states that he is not currently taking Zetia.  He has not made it to the pharmacy to pick it up.  He had it sent to [facility] to help with the cost of the medication, but he rarely goes there.  His primary pharmacy is [facility].  He would like the opportunity to pick that medication up prior to having his lipids repeated.  He has previously been intolerant to statins.    He would like a refill of sildenafil.  He takes sildenafil as needed for ED.  He is only tolerant of the 20 mg dose.  At doses higher than 20 mg he has prolonged flushing sensation.  Most recent PSA was over the summer and normal.  Denies any urinary symptoms at present.    He is concerned with easy bruising.  This is predominantly occurring on his hands bilaterally.  He denies easy bleeding.  He read online that the bruising could be just due to loss of fat from the hands with aging.  Denies any family history of bleeding disorders.  No personal history of liver disorder.    He takes pantoprazole twice weekly for GERD symptoms with good control.  Denies any dysphagia, nausea, vomiting, abdominal pain.  He has had some intermittent gastrointestinal upset over the past 1 to 2 weeks; however, he believes this to be related to probable viral gastroenteritis.  He had some diarrhea with it, but he has not had diarrhea in the past 2 to 3 days.  He had a Cologuard in August and that was negative.  He would like to give his symptoms a few more days to resolve prior to any significant work-up.    Additionally, he takes allopurinol for gout.  States that he has plenty of this medication and does not need a refill at this time.  No recent gout flares.    He also mentions having a cyst on the right thumb.  States it has been there for several months.  His prior PCP advised him that he  would have to see a hand surgeon for removal.  He really does not want to see anyone at this time.  He states he wanted to make mention of it just to make me aware, and then if it becomes a problem he may pursue something at that time.      Past Medical History:   Diagnosis Date   • GERD (gastroesophageal reflux disease)    • Hyperlipidemia        Allergies   Allergen Reactions   • Sulfa Antibiotics Anaphylaxis     High fevers and heart issues        • Naproxen Sodium Itching   • Keflex [Cephalexin] Itching        Past Surgical History:   Procedure Laterality Date   • COLONOSCOPY     • VENOUS ABLATION          Social History     Tobacco Use   • Smoking status: Never Smoker   • Smokeless tobacco: Never Used   Substance Use Topics   • Alcohol use: No   • Drug use: No       Family History   Problem Relation Age of Onset   • Cancer Mother    • Cancer Father         Health Maintenance Due   Topic Date Due   • COVID-19 Vaccine (1) Never done   • TDAP/TD VACCINES (1 - Tdap) Never done   • ZOSTER VACCINE (1 of 2) Never done   • HEPATITIS C SCREENING  Never done   • ANNUAL PHYSICAL  11/15/2020        Current Outpatient Medications on File Prior to Visit   Medication Sig   • allopurinol (ZYLOPRIM) 100 MG tablet Take 100 mg by mouth Daily.   • ezetimibe (ZETIA) 10 MG tablet Take 1 tablet by mouth Daily.   • ibuprofen (ADVIL,MOTRIN) 800 MG tablet TAKE ONE TABLET BY MOUTH EVERY 8 HOURS AS NEEDED FOR MILD PAIN   • pantoprazole (PROTONIX) 40 MG EC tablet Take 40 mg by mouth Daily.   • [DISCONTINUED] sildenafil (REVATIO) 20 MG tablet Take 20 mg by mouth Daily As Needed.   • [DISCONTINUED] fluticasone (FLONASE) 50 MCG/ACT nasal spray    • [DISCONTINUED] terbinafine (lamiSIL) 250 MG tablet Take 1 tablet by mouth Daily.     No current facility-administered medications on file prior to visit.         There is no immunization history on file for this patient.    Review of Systems     Objective     /82   Pulse 76   Temp 96.8 °F  (36 °C)   Wt 127 kg (281 lb)   SpO2 99%   BMI 41.50 kg/m²       Physical Exam  Vitals reviewed.   Constitutional:       General: He is not in acute distress.     Appearance: Normal appearance. He is well-developed. He is obese.   HENT:      Head: Normocephalic and atraumatic.   Eyes:      General: No scleral icterus.     Conjunctiva/sclera: Conjunctivae normal.   Neck:      Trachea: Trachea normal.   Cardiovascular:      Rate and Rhythm: Normal rate and regular rhythm.      Pulses: Normal pulses.      Heart sounds: No murmur heard.      Pulmonary:      Effort: Pulmonary effort is normal.      Breath sounds: Normal breath sounds. No wheezing, rhonchi or rales.   Musculoskeletal:         General: Normal range of motion.      Cervical back: Normal range of motion and neck supple.      Right lower leg: No edema.      Left lower leg: No edema.      Comments: Small cystic lesion at the lateral aspect of the distal interphalangeal joint on the right hand.   Lymphadenopathy:      Cervical: No cervical adenopathy.   Skin:     General: Skin is warm and dry.      Findings: Bruising (approximate nataly to quarter-sized areas of ecchymosis on the hands bilaterally, between the thumb and index finger) present.   Neurological:      Mental Status: He is alert and oriented to person, place, and time.   Psychiatric:         Mood and Affect: Mood and affect normal.         Behavior: Behavior normal.         Thought Content: Thought content normal.         Judgment: Judgment normal.         Result Review :     The following data was reviewed by: GABI Echeverria on 12/01/2021:    CMP    CMP 9/13/21   Albumin 4.40   Total Bilirubin 0.2   Alkaline Phosphatase 110   AST (SGOT) 18   ALT (SGPT) 22           SCANNED - LABS (08/17/2021)  Outside labs 6/17/2021:  CBC - NL  CMP - NL, including LFTs  Lipids - abnl - cholesterol 435, Trig 109, HDL 66,   Hgb A1C 5.5%  PSA normal   Uric acid normal    Data reviewed: GI studies :    Cologuard - Stool, Per Rectum (10/28/2021)         Assessment and Plan      Diagnoses and all orders for this visit:    1. Erectile dysfunction, unspecified erectile dysfunction type (Primary)  -     sildenafil (REVATIO) 20 MG tablet; Take one tablet by mouth PRN prior to sexual activity  Dispense: 40 tablet; Refill: 2    2. Easy bruising  -     CBC Auto Differential  -     Protime-INR  -     APTT  -     Von Willebrand Panel    3. Cyst of joint of right hand  Comments:  thumb    4. Mixed hyperlipidemia  Comments:   Zetia from [facility] and follow up in 3 months    5. Gastroesophageal reflux disease without esophagitis  Comments:  continue Protonix BIW    6. Chronic gout without tophus, unspecified cause, unspecified site  Comments:  continue allopurinol    7. Need for hepatitis C screening test  -     Hepatitis C Antibody            Follow Up     Return in about 3 months (around 3/1/2022) for Annual physical, Next scheduled follow up.    I will check labs to further assess his easy bruising.    In regards to his thumb, he would like to defer until the cyst is actually giving him some issues.  He will continue to monitor and let me know should this occur.    I want him to initiate Setia and follow-up in 3 months for annual physical exam and fasting labs.  He states he does not need other refills at this time aside from sildenafil.    Patient was given instructions and counseling regarding his condition or for health maintenance advice. Please see specific information pulled into the AVS if appropriate.     Kip Oliveira  reports that he has never smoked. He has never used smokeless tobacco.

## 2021-12-01 NOTE — PROGRESS NOTES
Venipuncture Blood Specimen Collection  Venipuncture performed in left arm  by Franchesca Jaime with good hemostasis. Patient tolerated the procedure well without complications.      12/01/21   Franchesca Jaime

## 2021-12-02 LAB — HCV AB SER DONR QL: NORMAL

## 2021-12-03 LAB
FACT VIII ACT/NOR PPP: 94 % (ref 56–140)
PATH INTERP BLD-IMP: NORMAL
VWF AG ACT/NOR PPP IA: 131 % (ref 50–200)
VWF:RCO ACT/NOR PPP PL AGG: 57 % (ref 50–200)

## 2022-03-01 ENCOUNTER — OFFICE VISIT (OUTPATIENT)
Dept: FAMILY MEDICINE CLINIC | Facility: CLINIC | Age: 62
End: 2022-03-01

## 2022-03-01 ENCOUNTER — TELEPHONE (OUTPATIENT)
Dept: FAMILY MEDICINE CLINIC | Facility: CLINIC | Age: 62
End: 2022-03-01

## 2022-03-01 VITALS
BODY MASS INDEX: 41.62 KG/M2 | OXYGEN SATURATION: 99 % | HEIGHT: 69 IN | SYSTOLIC BLOOD PRESSURE: 138 MMHG | TEMPERATURE: 97 F | DIASTOLIC BLOOD PRESSURE: 80 MMHG | HEART RATE: 66 BPM | WEIGHT: 281 LBS

## 2022-03-01 DIAGNOSIS — E78.2 MIXED HYPERLIPIDEMIA: ICD-10-CM

## 2022-03-01 DIAGNOSIS — Z13.220 LIPID SCREENING: ICD-10-CM

## 2022-03-01 DIAGNOSIS — N52.9 ERECTILE DYSFUNCTION, UNSPECIFIED ERECTILE DYSFUNCTION TYPE: ICD-10-CM

## 2022-03-01 DIAGNOSIS — E66.01 CLASS 3 SEVERE OBESITY DUE TO EXCESS CALORIES WITHOUT SERIOUS COMORBIDITY WITH BODY MASS INDEX (BMI) OF 40.0 TO 44.9 IN ADULT: ICD-10-CM

## 2022-03-01 DIAGNOSIS — Z28.21 COVID-19 VACCINATION DECLINED: ICD-10-CM

## 2022-03-01 DIAGNOSIS — Z00.00 ENCOUNTER FOR ANNUAL PHYSICAL EXAM: Primary | ICD-10-CM

## 2022-03-01 DIAGNOSIS — Z87.39 HISTORY OF GOUT: ICD-10-CM

## 2022-03-01 DIAGNOSIS — R09.89 CHRONIC SINUS COMPLAINTS: ICD-10-CM

## 2022-03-01 DIAGNOSIS — Z28.21 VACCINATION DECLINED BY PATIENT: ICD-10-CM

## 2022-03-01 DIAGNOSIS — Z13.1 SCREENING FOR DIABETES MELLITUS: ICD-10-CM

## 2022-03-01 PROCEDURE — 99396 PREV VISIT EST AGE 40-64: CPT | Performed by: NURSE PRACTITIONER

## 2022-03-01 RX ORDER — LORATADINE 10 MG/1
10 TABLET ORAL DAILY
Qty: 90 TABLET | Refills: 1 | Status: SHIPPED | OUTPATIENT
Start: 2022-03-01 | End: 2022-06-10

## 2022-03-01 RX ORDER — EZETIMIBE 10 MG/1
10 TABLET ORAL DAILY
Qty: 90 TABLET | Refills: 1 | Status: SHIPPED | OUTPATIENT
Start: 2022-03-01 | End: 2022-09-19

## 2022-03-01 NOTE — TELEPHONE ENCOUNTER
Caller: Kip Oliveira    Relationship: Self    Best call back number: [phone]    What is the best time to reach you: ANYTIME    What was the call regarding: PATIENT CALLED SAYING THAT HE FORGOT TO  THE WRITTEN SCRIPT THAT LORRIE LAL WAS GOING TO PRESCRIBE DURING HIS APPOINTMENT TODAY, 03/01/2022. HE WILL COME BY LATER THIS AFTERNOON TO PICK THIS UP AT .     Do you require a callback: IF NEEDED

## 2022-03-01 NOTE — PROGRESS NOTES
"Chief Complaint  Annual Exam    Subjective            Kip Oliveira presents to [facility]  History of Present Illness     Patient presents to the office today for annual physical exam.    His last PSA was in June 2021 and was normal.  He denies any urinary hesitancy, urgency, or frequency.  He does occasionally get up in the middle the night to urinate.  He has ED and is prescribed sildenafil for treatment.  He states that this is not always efficacious.  He would like to have a repeat PSA, as well as his testosterone checked.    He admits to weight gain of approximately 20 pounds.  He had lost weight following a ketogenic diet.  On his scales, he had gotten down to around 240 pounds, and is now up to 265 pounds approximately.  He states that our scales weigh him heavier due to clothing, his wallet, and his firearm.  He states that he has been making poor diet choices over the past several months.  He and his wife have had the discussion of getting back on a better diet regimen.  Since gaining the weight he has noticed an increase in his bilateral hip pain and knee pain.  He reports a history of \"bone-on-bone\" in regards to his knees.  He is not ready to undergo knee replacement at this time.    He has known dyslipidemia.  He was prescribed Zetia last August.  About a month ago he went to get the [facility] and they told him that they did not have the prescription for him.  He has never initiated it.  When Dr. Mcknight, told him that his lipids were abnormal he started doubling his fish oil and saw improvement in lipids with that.    He describes chronic sinus issues since the onset of Covid \"before we knew it was Covid\".  He states it started in the fall 2019.  No prior history of allergy or sinus issues.  He has been using Flonase without any significant improvement.  His symptoms are not all of the time, but intermittent.  He describes intermittent headache, " toothache, facial pressure.  He is not taking an antihistamine.  He does not believe that his symptoms are bad enough for consistent enough to warrant antibiotic therapy at this time.    He has gout, but no recent gout flares.  Last uric acid in June 2021 was normal.  He takes allopurinol twice a week and that seems to singh off any gout flares.    He has GERD, but only intermittently.  He also only takes pantoprazole twice a week and that seemingly helps alleviate his symptoms.    Has colorectal cancer screening is up-to-date.  He had a Cologuard test which was negative in October 2021.    PHQ-2 Total Score: 0      Past Medical History:   Diagnosis Date   • GERD (gastroesophageal reflux disease)    • Hyperlipidemia        Allergies   Allergen Reactions   • Sulfa Antibiotics Anaphylaxis     High fevers and heart issues        • Naproxen Sodium Itching   • Keflex [Cephalexin] Itching        Past Surgical History:   Procedure Laterality Date   • COLONOSCOPY     • VENOUS ABLATION          Social History     Tobacco Use   • Smoking status: Never Smoker   • Smokeless tobacco: Never Used   Vaping Use   • Vaping Use: Never used   Substance Use Topics   • Alcohol use: No   • Drug use: No       Family History   Problem Relation Age of Onset   • Cancer Mother    • Cancer Father         Health Maintenance Due   Topic Date Due   • TDAP/TD VACCINES (1 - Tdap) Never done   • ZOSTER VACCINE (1 of 2) Never done        Current Outpatient Medications on File Prior to Visit   Medication Sig   • allopurinol (ZYLOPRIM) 100 MG tablet Take 100 mg by mouth Daily.   • ibuprofen (ADVIL,MOTRIN) 800 MG tablet TAKE ONE TABLET BY MOUTH EVERY 8 HOURS AS NEEDED FOR MILD PAIN   • pantoprazole (PROTONIX) 40 MG EC tablet Take 40 mg by mouth Daily.   • sildenafil (REVATIO) 20 MG tablet Take one tablet by mouth PRN prior to sexual activity   • [DISCONTINUED] ezetimibe (ZETIA) 10 MG tablet Take 1 tablet by mouth Daily.     No current  "facility-administered medications on file prior to visit.         There is no immunization history on file for this patient.    Review of Systems     Objective     /80   Pulse 66   Temp 97 °F (36.1 °C)   Ht 175.3 cm (69\")   Wt 127 kg (281 lb)   SpO2 99%   BMI 41.50 kg/m²       Physical Exam  Vitals reviewed.   Constitutional:       General: He is not in acute distress.     Appearance: He is well-developed. He is obese.   HENT:      Head: Normocephalic and atraumatic.      Right Ear: Tympanic membrane, ear canal and external ear normal. There is no impacted cerumen.      Left Ear: Tympanic membrane, ear canal and external ear normal. There is no impacted cerumen.      Nose: Nose normal.      Mouth/Throat:      Mouth: Mucous membranes are moist.      Pharynx: Oropharynx is clear. No oropharyngeal exudate or posterior oropharyngeal erythema.   Eyes:      General: No scleral icterus.     Extraocular Movements: Extraocular movements intact.      Conjunctiva/sclera: Conjunctivae normal.   Neck:      Thyroid: No thyroid mass, thyromegaly or thyroid tenderness.      Vascular: No carotid bruit.      Trachea: Trachea normal.   Cardiovascular:      Rate and Rhythm: Normal rate and regular rhythm.      Pulses: Normal pulses.      Heart sounds: No murmur heard.      Pulmonary:      Effort: Pulmonary effort is normal.      Breath sounds: Normal breath sounds. No wheezing, rhonchi or rales.   Musculoskeletal:         General: Normal range of motion.      Cervical back: Normal range of motion and neck supple.      Right lower leg: No edema.      Left lower leg: No edema.   Lymphadenopathy:      Cervical: No cervical adenopathy.   Skin:     General: Skin is warm and dry.   Neurological:      Mental Status: He is alert and oriented to person, place, and time.   Psychiatric:         Mood and Affect: Mood and affect normal.         Behavior: Behavior normal.         Thought Content: Thought content normal.         Judgment: " Judgment normal.         Result Review :     The following data was reviewed by: GABI Echeverria on 03/01/2022:    SCANNED - LABS (08/17/2021)      Data reviewed: GI studies :   Cologuard - Stool, Per Rectum (10/28/2021)         Assessment and Plan      Diagnoses and all orders for this visit:    1. Encounter for annual physical exam (Primary)  -     CBC Auto Differential  -     Comprehensive Metabolic Panel  -     TSH+Free T4  -     Iron Profile  -     Ferritin  -     Vitamin B12 & Folate  -     Vitamin D 25 Hydroxy    2. COVID-19 vaccination declined    3. Vaccination declined by patient  Comments:  shingles    4. Lipid screening  -     Lipid Panel    5. Screening for diabetes mellitus  -     Hemoglobin A1c    6. History of gout  -     Uric Acid    7. Erectile dysfunction, unspecified erectile dysfunction type  -     Testosterone, Free, Total  -     PSA DIAGNOSTIC    8. Class 3 severe obesity due to excess calories without serious comorbidity with body mass index (BMI) of 40.0 to 44.9 in adult (HCC)  Comments:  Work on diet and exercise - decrease intake of sugars/carbohydrates/high fat foods - Physical activity  minutes daily.    9. Mixed hyperlipidemia  -     ezetimibe (ZETIA) 10 MG tablet; Take 1 tablet by mouth Daily.  Dispense: 90 tablet; Refill: 1    10. Chronic sinus complaints  -     loratadine (Claritin) 10 MG tablet; Take 1 tablet by mouth Daily.  Dispense: 90 tablet; Refill: 1            Follow Up     Return if symptoms worsen or fail to improve.     He will return to the office tomorrow morning for his fasting labs, prior to 9 AM due to testosterone levels being checked.  We will notify him of lab results.    Patient was given instructions and counseling regarding his condition or for health maintenance advice. Please see specific information pulled into the AVS if appropriate.     Kip Oliveira  reports that he has never smoked. He has never used smokeless tobacco.

## 2022-03-02 ENCOUNTER — CLINICAL SUPPORT (OUTPATIENT)
Dept: FAMILY MEDICINE CLINIC | Facility: CLINIC | Age: 62
End: 2022-03-02

## 2022-03-02 DIAGNOSIS — E78.5 HYPERLIPIDEMIA, UNSPECIFIED HYPERLIPIDEMIA TYPE: ICD-10-CM

## 2022-03-02 LAB
25(OH)D3 SERPL-MCNC: 52.8 NG/ML
ALBUMIN SERPL-MCNC: 4.1 G/DL (ref 3.5–5.2)
ALBUMIN/GLOB SERPL: 1.2 G/DL
ALP SERPL-CCNC: 110 U/L (ref 39–117)
ALT SERPL W P-5'-P-CCNC: 16 U/L (ref 1–41)
ANION GAP SERPL CALCULATED.3IONS-SCNC: 12.3 MMOL/L (ref 5–15)
AST SERPL-CCNC: 12 U/L (ref 1–40)
BASOPHILS # BLD AUTO: 0.11 10*3/MM3 (ref 0–0.2)
BASOPHILS NFR BLD AUTO: 1.7 % (ref 0–1.5)
BILIRUB SERPL-MCNC: 0.4 MG/DL (ref 0–1.2)
BUN SERPL-MCNC: 13 MG/DL (ref 8–23)
BUN/CREAT SERPL: 12.6 (ref 7–25)
CALCIUM SPEC-SCNC: 9.8 MG/DL (ref 8.6–10.5)
CHLORIDE SERPL-SCNC: 101 MMOL/L (ref 98–107)
CHOLEST SERPL-MCNC: 455 MG/DL (ref 0–200)
CO2 SERPL-SCNC: 23.7 MMOL/L (ref 22–29)
CREAT SERPL-MCNC: 1.03 MG/DL (ref 0.76–1.27)
DEPRECATED RDW RBC AUTO: 42.2 FL (ref 37–54)
EGFRCR SERPLBLD CKD-EPI 2021: 82.6 ML/MIN/1.73
EOSINOPHIL # BLD AUTO: 0.21 10*3/MM3 (ref 0–0.4)
EOSINOPHIL NFR BLD AUTO: 3.2 % (ref 0.3–6.2)
ERYTHROCYTE [DISTWIDTH] IN BLOOD BY AUTOMATED COUNT: 12.4 % (ref 12.3–15.4)
FERRITIN SERPL-MCNC: 454 NG/ML (ref 30–400)
FOLATE SERPL-MCNC: 10.6 NG/ML (ref 4.78–24.2)
GLOBULIN UR ELPH-MCNC: 3.3 GM/DL
GLUCOSE SERPL-MCNC: 98 MG/DL (ref 65–99)
HBA1C MFR BLD: 5.4 % (ref 4.8–5.6)
HCT VFR BLD AUTO: 45.2 % (ref 37.5–51)
HDLC SERPL-MCNC: 56 MG/DL (ref 40–60)
HGB BLD-MCNC: 14.9 G/DL (ref 13–17.7)
IMM GRANULOCYTES # BLD AUTO: 0.02 10*3/MM3 (ref 0–0.05)
IMM GRANULOCYTES NFR BLD AUTO: 0.3 % (ref 0–0.5)
IRON 24H UR-MRATE: 107 MCG/DL (ref 59–158)
IRON SATN MFR SERPL: 33 % (ref 20–50)
LDLC SERPL CALC-MCNC: 364 MG/DL (ref 0–100)
LDLC/HDLC SERPL: 6.53 {RATIO}
LYMPHOCYTES # BLD AUTO: 2.44 10*3/MM3 (ref 0.7–3.1)
LYMPHOCYTES NFR BLD AUTO: 37.3 % (ref 19.6–45.3)
MCH RBC QN AUTO: 30.4 PG (ref 26.6–33)
MCHC RBC AUTO-ENTMCNC: 33 G/DL (ref 31.5–35.7)
MCV RBC AUTO: 92.2 FL (ref 79–97)
MONOCYTES # BLD AUTO: 0.54 10*3/MM3 (ref 0.1–0.9)
MONOCYTES NFR BLD AUTO: 8.2 % (ref 5–12)
NEUTROPHILS NFR BLD AUTO: 3.23 10*3/MM3 (ref 1.7–7)
NEUTROPHILS NFR BLD AUTO: 49.3 % (ref 42.7–76)
NRBC BLD AUTO-RTO: 0 /100 WBC (ref 0–0.2)
PLATELET # BLD AUTO: 279 10*3/MM3 (ref 140–450)
PMV BLD AUTO: 10.5 FL (ref 6–12)
POTASSIUM SERPL-SCNC: 4.4 MMOL/L (ref 3.5–5.2)
PROT SERPL-MCNC: 7.4 G/DL (ref 6–8.5)
PSA SERPL-MCNC: 0.92 NG/ML (ref 0–4)
RBC # BLD AUTO: 4.9 10*6/MM3 (ref 4.14–5.8)
SODIUM SERPL-SCNC: 137 MMOL/L (ref 136–145)
T4 FREE SERPL-MCNC: 1.12 NG/DL (ref 0.93–1.7)
TIBC SERPL-MCNC: 328 MCG/DL (ref 298–536)
TRANSFERRIN SERPL-MCNC: 220 MG/DL (ref 200–360)
TRIGL SERPL-MCNC: 167 MG/DL (ref 0–150)
TSH SERPL DL<=0.05 MIU/L-ACNC: 1.48 UIU/ML (ref 0.27–4.2)
URATE SERPL-MCNC: 6.1 MG/DL (ref 3.4–7)
VIT B12 BLD-MCNC: 1050 PG/ML (ref 211–946)
VLDLC SERPL-MCNC: 35 MG/DL (ref 5–40)
WBC NRBC COR # BLD: 6.55 10*3/MM3 (ref 3.4–10.8)

## 2022-03-02 PROCEDURE — 36415 COLL VENOUS BLD VENIPUNCTURE: CPT | Performed by: NURSE PRACTITIONER

## 2022-03-02 PROCEDURE — 83036 HEMOGLOBIN GLYCOSYLATED A1C: CPT | Performed by: NURSE PRACTITIONER

## 2022-03-02 PROCEDURE — 84153 ASSAY OF PSA TOTAL: CPT | Performed by: NURSE PRACTITIONER

## 2022-03-02 PROCEDURE — 82728 ASSAY OF FERRITIN: CPT | Performed by: NURSE PRACTITIONER

## 2022-03-02 PROCEDURE — 82746 ASSAY OF FOLIC ACID SERUM: CPT | Performed by: NURSE PRACTITIONER

## 2022-03-02 PROCEDURE — 80050 GENERAL HEALTH PANEL: CPT | Performed by: NURSE PRACTITIONER

## 2022-03-02 PROCEDURE — 83540 ASSAY OF IRON: CPT | Performed by: NURSE PRACTITIONER

## 2022-03-02 PROCEDURE — 80061 LIPID PANEL: CPT | Performed by: NURSE PRACTITIONER

## 2022-03-02 PROCEDURE — 84403 ASSAY OF TOTAL TESTOSTERONE: CPT | Performed by: NURSE PRACTITIONER

## 2022-03-02 PROCEDURE — 82306 VITAMIN D 25 HYDROXY: CPT | Performed by: NURSE PRACTITIONER

## 2022-03-02 PROCEDURE — 84550 ASSAY OF BLOOD/URIC ACID: CPT | Performed by: NURSE PRACTITIONER

## 2022-03-02 PROCEDURE — 82607 VITAMIN B-12: CPT | Performed by: NURSE PRACTITIONER

## 2022-03-02 PROCEDURE — 84439 ASSAY OF FREE THYROXINE: CPT | Performed by: NURSE PRACTITIONER

## 2022-03-02 PROCEDURE — 84466 ASSAY OF TRANSFERRIN: CPT | Performed by: NURSE PRACTITIONER

## 2022-03-02 PROCEDURE — 84402 ASSAY OF FREE TESTOSTERONE: CPT | Performed by: NURSE PRACTITIONER

## 2022-03-02 NOTE — PROGRESS NOTES
Venipuncture Blood Specimen Collection  Venipuncture performed in left arm  by Franchesca Jaime with good hemostasis. Patient tolerated the procedure well without complications.   03/02/22   Franchesca Jaime

## 2022-03-04 LAB
TESTOST FREE SERPL-MCNC: 7.7 PG/ML (ref 6.6–18.1)
TESTOST SERPL-MCNC: 627 NG/DL (ref 264–916)

## 2022-06-10 DIAGNOSIS — R09.89 CHRONIC SINUS COMPLAINTS: ICD-10-CM

## 2022-06-10 RX ORDER — LORATADINE 10 MG/1
TABLET ORAL
Qty: 90 TABLET | Refills: 0 | Status: SHIPPED | OUTPATIENT
Start: 2022-06-10 | End: 2022-09-19 | Stop reason: SDUPTHER

## 2022-09-19 ENCOUNTER — OFFICE VISIT (OUTPATIENT)
Dept: FAMILY MEDICINE CLINIC | Facility: CLINIC | Age: 62
End: 2022-09-19

## 2022-09-19 VITALS
DIASTOLIC BLOOD PRESSURE: 90 MMHG | SYSTOLIC BLOOD PRESSURE: 140 MMHG | WEIGHT: 291 LBS | TEMPERATURE: 96.8 F | OXYGEN SATURATION: 100 % | BODY MASS INDEX: 42.97 KG/M2 | HEART RATE: 76 BPM

## 2022-09-19 DIAGNOSIS — K21.9 GASTROESOPHAGEAL REFLUX DISEASE WITHOUT ESOPHAGITIS: ICD-10-CM

## 2022-09-19 DIAGNOSIS — N52.9 ERECTILE DYSFUNCTION, UNSPECIFIED ERECTILE DYSFUNCTION TYPE: ICD-10-CM

## 2022-09-19 DIAGNOSIS — R09.89 CHRONIC SINUS COMPLAINTS: ICD-10-CM

## 2022-09-19 DIAGNOSIS — E78.5 HYPERLIPIDEMIA, UNSPECIFIED HYPERLIPIDEMIA TYPE: Primary | ICD-10-CM

## 2022-09-19 DIAGNOSIS — M1A.00X0 CHRONIC PRIMARY GOUT: ICD-10-CM

## 2022-09-19 DIAGNOSIS — R79.89 ELEVATED FERRITIN: ICD-10-CM

## 2022-09-19 PROCEDURE — 99214 OFFICE O/P EST MOD 30 MIN: CPT | Performed by: NURSE PRACTITIONER

## 2022-09-19 RX ORDER — ALLOPURINOL 100 MG/1
100 TABLET ORAL DAILY
Qty: 90 TABLET | Refills: 0 | Status: SHIPPED | OUTPATIENT
Start: 2022-09-19 | End: 2023-03-17 | Stop reason: SDUPTHER

## 2022-09-19 RX ORDER — PANTOPRAZOLE SODIUM 40 MG/1
40 TABLET, DELAYED RELEASE ORAL DAILY PRN
Qty: 90 TABLET | Refills: 0 | Status: SHIPPED | OUTPATIENT
Start: 2022-09-19 | End: 2023-03-17

## 2022-09-19 RX ORDER — SILDENAFIL CITRATE 20 MG/1
TABLET ORAL
Qty: 40 TABLET | Refills: 2 | Status: SHIPPED | OUTPATIENT
Start: 2022-09-19

## 2022-09-19 RX ORDER — EZETIMIBE 10 MG/1
10 TABLET ORAL DAILY
Qty: 90 TABLET | Refills: 1 | Status: SHIPPED | OUTPATIENT
Start: 2022-09-19 | End: 2023-03-17 | Stop reason: SDUPTHER

## 2022-09-19 RX ORDER — EZETIMIBE 10 MG/1
10 TABLET ORAL DAILY
Qty: 90 TABLET | Refills: 1 | Status: CANCELLED | OUTPATIENT
Start: 2022-09-19

## 2022-09-19 RX ORDER — LORATADINE 10 MG/1
10 TABLET ORAL DAILY
Qty: 90 TABLET | Refills: 1 | Status: SHIPPED | OUTPATIENT
Start: 2022-09-19 | End: 2023-03-17 | Stop reason: SDUPTHER

## 2022-09-19 NOTE — PROGRESS NOTES
Chief Complaint  Hyperlipidemia    Subjective            Kip Oliveira presents to [facility]  History of Present Illness     Patient presents to the office today for medication refills and labs.    He is currently prescribed allopurinol 100 mg daily for treatment of chronic gout.  He has not had a gout flare in several years -not since Dr. Lorenz was practicing.  Dr. Mcknight was the one who actually initiated the allopurinol.  He does not take it daily, but rather 2-3 times per week.  This seems to be working well for him.    He is taking loratadine daily as needed for allergic rhinitis symptoms.  He states that he and his wife recently traveled to [place] for 2 weeks and May.  When they returned home they were sick for a few weeks.  His wife's father then passed away, and they had to return to [place] for another 2 weeks.  During that time, which was the , they contracted COVID-19.  They seemingly got over those symptoms, and then a few weeks ago they were sick again.  He currently denies any fever, chills, or body aches.  Denies any runny nose or congestion.  No cough, wheeze, or shortness of breath.    He is prescribed sildenafil as needed for ED.  He states this is working well.  No issues.    He is prescribed Protonix for GERD symptoms.  He only takes this as needed when he has symptoms.  He denies any dysphagia, nausea, vomiting, or abdominal pain.  He really only has heartburn or acid reflux when he uses liquid smoke.  Usually if he has heartburn or reflux he will develop some palpitations.  He reports having previous evaluation with EGD and no suspicious findings noted.    He has been taking Zetia for dyslipidemia for the past 6 months.  No complaints of myalgia with use.  He states that he tolerates this much better than statin therapy.  He currently denies any chest pain, headaches, shortness of breath, or lower extremity edema.  He gets occasional dizziness with  sinus symptoms, but otherwise denies dizziness.      Past Medical History:   Diagnosis Date   • GERD (gastroesophageal reflux disease)    • Hyperlipidemia        Allergies   Allergen Reactions   • Sulfa Antibiotics Anaphylaxis     High fevers and heart issues        • Naproxen Sodium Itching   • Keflex [Cephalexin] Itching        Past Surgical History:   Procedure Laterality Date   • COLONOSCOPY     • VENOUS ABLATION          Social History     Tobacco Use   • Smoking status: Never Smoker   • Smokeless tobacco: Never Used   Vaping Use   • Vaping Use: Never used   Substance Use Topics   • Alcohol use: No   • Drug use: No       Family History   Problem Relation Age of Onset   • Cancer Mother    • Cancer Father         Health Maintenance Due   Topic Date Due   • TDAP/TD VACCINES (1 - Tdap) Never done   • ZOSTER VACCINE (1 of 2) Never done        Current Outpatient Medications on File Prior to Visit   Medication Sig   • ibuprofen (ADVIL,MOTRIN) 800 MG tablet TAKE ONE TABLET BY MOUTH EVERY 8 HOURS AS NEEDED FOR MILD PAIN   • [DISCONTINUED] Allergy Relief 10 MG tablet TAKE 1 TABLET BY MOUTH DAILY **THIS MEDICATION HAS BEEN SHORT FILLED TO LINE UP WITH YOUR OTHER MEDICATIONS**   • [DISCONTINUED] allopurinol (ZYLOPRIM) 100 MG tablet Take 100 mg by mouth Daily.   • [DISCONTINUED] ezetimibe (ZETIA) 10 MG tablet Take 1 tablet by mouth Daily.   • [DISCONTINUED] pantoprazole (PROTONIX) 40 MG EC tablet Take 40 mg by mouth Daily.   • [DISCONTINUED] sildenafil (REVATIO) 20 MG tablet Take one tablet by mouth PRN prior to sexual activity     No current facility-administered medications on file prior to visit.         There is no immunization history on file for this patient.    Review of Systems     Objective     /90   Pulse 76   Temp 96.8 °F (36 °C)   Wt 132 kg (291 lb)   SpO2 100%   BMI 42.97 kg/m²       Physical Exam  Vitals reviewed.   Constitutional:       General: He is not in acute distress.     Appearance: He is  well-developed. He is obese.   HENT:      Head: Normocephalic and atraumatic.   Eyes:      General: No scleral icterus.     Extraocular Movements: Extraocular movements intact.      Conjunctiva/sclera: Conjunctivae normal.   Neck:      Thyroid: No thyroid mass, thyromegaly or thyroid tenderness.      Vascular: No carotid bruit.      Trachea: Trachea normal.   Cardiovascular:      Rate and Rhythm: Normal rate and regular rhythm.      Pulses: Normal pulses.      Heart sounds: No murmur heard.  Pulmonary:      Effort: Pulmonary effort is normal. No respiratory distress.      Breath sounds: Normal breath sounds. No wheezing, rhonchi or rales.   Musculoskeletal:         General: Normal range of motion.      Cervical back: Normal range of motion and neck supple.      Right lower leg: No edema.      Left lower leg: No edema.   Lymphadenopathy:      Cervical: No cervical adenopathy.   Skin:     General: Skin is warm and dry.   Neurological:      Mental Status: He is alert and oriented to person, place, and time.   Psychiatric:         Mood and Affect: Mood and affect normal.         Behavior: Behavior normal.         Thought Content: Thought content normal.         Judgment: Judgment normal.         Result Review :     The following data was reviewed by: GABI Echeverria on 09/19/2022:    CBC Auto Differential (03/02/2022 08:32)  Comprehensive Metabolic Panel (03/02/2022 08:32)  Lipid Panel (03/02/2022 08:32)  TSH+Free T4 (03/02/2022 08:32)  Iron Profile (03/02/2022 08:32)  Ferritin (03/02/2022 08:32)  Vitamin B12 & Folate (03/02/2022 08:32)  Vitamin D 25 Hydroxy (03/02/2022 08:32)  PSA DIAGNOSTIC (03/02/2022 08:32)  Hemoglobin A1c (03/02/2022 08:32)  Uric Acid (03/02/2022 08:32)              Assessment and Plan      Diagnoses and all orders for this visit:    1. Hyperlipidemia, unspecified hyperlipidemia type (Primary)  -     ezetimibe (Zetia) 10 MG tablet; Take 1 tablet by mouth Daily.  Dispense: 90 tablet;  Refill: 1  -     Comprehensive Metabolic Panel  -     Lipid Panel    2. Gastroesophageal reflux disease without esophagitis  -     pantoprazole (PROTONIX) 40 MG EC tablet; Take 1 tablet by mouth Daily As Needed (heartburn/acid reflux symptoms).  Dispense: 90 tablet; Refill: 0    3. Chronic sinus complaints  -     loratadine (Allergy Relief) 10 MG tablet; Take 1 tablet by mouth Daily.  Dispense: 90 tablet; Refill: 1    4. Chronic primary gout  -     allopurinol (ZYLOPRIM) 100 MG tablet; Take 1 tablet by mouth Daily.  Dispense: 90 tablet; Refill: 0  -     Uric acid    5. Erectile dysfunction, unspecified erectile dysfunction type  -     sildenafil (REVATIO) 20 MG tablet; Take one tablet by mouth PRN prior to sexual activity  Dispense: 40 tablet; Refill: 2    6. Elevated ferritin  -     CBC Auto Differential  -     Ferritin            Follow Up     Return in about 6 months (around 3/19/2023) for Next scheduled follow up.    Patient was given instructions and counseling regarding his condition or for health maintenance advice. Please see specific information pulled into the AVS if appropriate.     Kip Oliveira  reports that he has never smoked. He has never used smokeless tobacco.

## 2022-09-20 ENCOUNTER — CLINICAL SUPPORT (OUTPATIENT)
Dept: FAMILY MEDICINE CLINIC | Facility: CLINIC | Age: 62
End: 2022-09-20

## 2022-09-20 DIAGNOSIS — E78.5 HYPERLIPIDEMIA, UNSPECIFIED HYPERLIPIDEMIA TYPE: ICD-10-CM

## 2022-09-20 LAB
ALBUMIN SERPL-MCNC: 4.2 G/DL (ref 3.5–5.2)
ALBUMIN/GLOB SERPL: 1.6 G/DL
ALP SERPL-CCNC: 107 U/L (ref 39–117)
ALT SERPL W P-5'-P-CCNC: 16 U/L (ref 1–41)
ANION GAP SERPL CALCULATED.3IONS-SCNC: 10 MMOL/L (ref 5–15)
AST SERPL-CCNC: 17 U/L (ref 1–40)
BASOPHILS # BLD AUTO: 0.11 10*3/MM3 (ref 0–0.2)
BASOPHILS NFR BLD AUTO: 1.3 % (ref 0–1.5)
BILIRUB SERPL-MCNC: 0.5 MG/DL (ref 0–1.2)
BUN SERPL-MCNC: 16 MG/DL (ref 8–23)
BUN/CREAT SERPL: 16.7 (ref 7–25)
CALCIUM SPEC-SCNC: 9.8 MG/DL (ref 8.6–10.5)
CHLORIDE SERPL-SCNC: 102 MMOL/L (ref 98–107)
CHOLEST SERPL-MCNC: 223 MG/DL (ref 0–200)
CO2 SERPL-SCNC: 26 MMOL/L (ref 22–29)
CREAT SERPL-MCNC: 0.96 MG/DL (ref 0.76–1.27)
DEPRECATED RDW RBC AUTO: 41.7 FL (ref 37–54)
EGFRCR SERPLBLD CKD-EPI 2021: 89.4 ML/MIN/1.73
EOSINOPHIL # BLD AUTO: 0.33 10*3/MM3 (ref 0–0.4)
EOSINOPHIL NFR BLD AUTO: 3.9 % (ref 0.3–6.2)
ERYTHROCYTE [DISTWIDTH] IN BLOOD BY AUTOMATED COUNT: 12.9 % (ref 12.3–15.4)
FERRITIN SERPL-MCNC: 549 NG/ML (ref 30–400)
GLOBULIN UR ELPH-MCNC: 2.7 GM/DL
GLUCOSE SERPL-MCNC: 94 MG/DL (ref 65–99)
HCT VFR BLD AUTO: 42.5 % (ref 37.5–51)
HDLC SERPL-MCNC: 61 MG/DL (ref 40–60)
HGB BLD-MCNC: 14.1 G/DL (ref 13–17.7)
IMM GRANULOCYTES # BLD AUTO: 0.07 10*3/MM3 (ref 0–0.05)
IMM GRANULOCYTES NFR BLD AUTO: 0.8 % (ref 0–0.5)
LDLC SERPL CALC-MCNC: 133 MG/DL (ref 0–100)
LDLC/HDLC SERPL: 2.12 {RATIO}
LYMPHOCYTES # BLD AUTO: 3.07 10*3/MM3 (ref 0.7–3.1)
LYMPHOCYTES NFR BLD AUTO: 35.9 % (ref 19.6–45.3)
MCH RBC QN AUTO: 30.2 PG (ref 26.6–33)
MCHC RBC AUTO-ENTMCNC: 33.2 G/DL (ref 31.5–35.7)
MCV RBC AUTO: 91 FL (ref 79–97)
MONOCYTES # BLD AUTO: 0.73 10*3/MM3 (ref 0.1–0.9)
MONOCYTES NFR BLD AUTO: 8.5 % (ref 5–12)
NEUTROPHILS NFR BLD AUTO: 4.25 10*3/MM3 (ref 1.7–7)
NEUTROPHILS NFR BLD AUTO: 49.6 % (ref 42.7–76)
NRBC BLD AUTO-RTO: 0 /100 WBC (ref 0–0.2)
PLATELET # BLD AUTO: 288 10*3/MM3 (ref 140–450)
PMV BLD AUTO: 9.8 FL (ref 6–12)
POTASSIUM SERPL-SCNC: 4.3 MMOL/L (ref 3.5–5.2)
PROT SERPL-MCNC: 6.9 G/DL (ref 6–8.5)
RBC # BLD AUTO: 4.67 10*6/MM3 (ref 4.14–5.8)
SODIUM SERPL-SCNC: 138 MMOL/L (ref 136–145)
TRIGL SERPL-MCNC: 164 MG/DL (ref 0–150)
VLDLC SERPL-MCNC: 29 MG/DL (ref 5–40)
WBC NRBC COR # BLD: 8.56 10*3/MM3 (ref 3.4–10.8)

## 2022-09-20 PROCEDURE — 36415 COLL VENOUS BLD VENIPUNCTURE: CPT | Performed by: NURSE PRACTITIONER

## 2022-09-20 PROCEDURE — 80061 LIPID PANEL: CPT | Performed by: NURSE PRACTITIONER

## 2022-09-20 PROCEDURE — 85025 COMPLETE CBC W/AUTO DIFF WBC: CPT | Performed by: NURSE PRACTITIONER

## 2022-09-20 PROCEDURE — 82728 ASSAY OF FERRITIN: CPT | Performed by: NURSE PRACTITIONER

## 2022-09-20 PROCEDURE — 80053 COMPREHEN METABOLIC PANEL: CPT | Performed by: NURSE PRACTITIONER

## 2022-09-20 NOTE — PROGRESS NOTES
Venipuncture Blood Specimen Collection  Venipuncture performed in left arm by Franchesca Jaime with good hemostasis. Patient tolerated the procedure well without complications.   09/20/22   [facility]

## 2022-09-21 ENCOUNTER — PATIENT MESSAGE (OUTPATIENT)
Dept: FAMILY MEDICINE CLINIC | Facility: CLINIC | Age: 62
End: 2022-09-21

## 2022-09-21 DIAGNOSIS — R79.89 ELEVATED FERRITIN: Primary | ICD-10-CM

## 2022-09-21 DIAGNOSIS — R51.9 FREQUENT HEADACHES: ICD-10-CM

## 2022-09-21 RX ORDER — IBUPROFEN 800 MG/1
800 TABLET ORAL EVERY 8 HOURS PRN
Qty: 270 TABLET | Refills: 0 | Status: SHIPPED | OUTPATIENT
Start: 2022-09-21 | End: 2023-03-17 | Stop reason: SDUPTHER

## 2022-09-21 NOTE — TELEPHONE ENCOUNTER
From: Kip Oliveira  To: GABI Echeverria  Sent: 9/21/2022 11:31 AM EDT  Subject: Ibuprofen    I picked up the meds from [facility] and Ibuprofen was not refilled. Can you get that refilled? The last prescription I had was 4x day 800mg tablets.

## 2022-10-27 ENCOUNTER — OFFICE VISIT (OUTPATIENT)
Dept: FAMILY MEDICINE CLINIC | Facility: CLINIC | Age: 62
End: 2022-10-27

## 2022-10-27 VITALS
WEIGHT: 278 LBS | OXYGEN SATURATION: 98 % | BODY MASS INDEX: 41.05 KG/M2 | SYSTOLIC BLOOD PRESSURE: 172 MMHG | TEMPERATURE: 97.4 F | DIASTOLIC BLOOD PRESSURE: 100 MMHG | HEART RATE: 84 BPM

## 2022-10-27 DIAGNOSIS — R00.2 PALPITATIONS: ICD-10-CM

## 2022-10-27 DIAGNOSIS — R79.89 ELEVATED FERRITIN: ICD-10-CM

## 2022-10-27 DIAGNOSIS — Z28.21 INFLUENZA VACCINATION DECLINED BY PATIENT: ICD-10-CM

## 2022-10-27 DIAGNOSIS — I10 ESSENTIAL HYPERTENSION: Primary | ICD-10-CM

## 2022-10-27 DIAGNOSIS — R14.2 FLATULENCE, ERUCTATION AND GAS PAIN: ICD-10-CM

## 2022-10-27 DIAGNOSIS — K21.9 GASTROESOPHAGEAL REFLUX DISEASE, UNSPECIFIED WHETHER ESOPHAGITIS PRESENT: ICD-10-CM

## 2022-10-27 DIAGNOSIS — R10.13 DYSPEPSIA: ICD-10-CM

## 2022-10-27 DIAGNOSIS — R14.1 FLATULENCE, ERUCTATION AND GAS PAIN: ICD-10-CM

## 2022-10-27 DIAGNOSIS — R14.3 FLATULENCE, ERUCTATION AND GAS PAIN: ICD-10-CM

## 2022-10-27 LAB
BASOPHILS # BLD AUTO: 0.11 10*3/MM3 (ref 0–0.2)
BASOPHILS NFR BLD AUTO: 1.2 % (ref 0–1.5)
DEPRECATED RDW RBC AUTO: 42.8 FL (ref 37–54)
EOSINOPHIL # BLD AUTO: 0.23 10*3/MM3 (ref 0–0.4)
EOSINOPHIL NFR BLD AUTO: 2.5 % (ref 0.3–6.2)
ERYTHROCYTE [DISTWIDTH] IN BLOOD BY AUTOMATED COUNT: 12.5 % (ref 12.3–15.4)
HCT VFR BLD AUTO: 44.1 % (ref 37.5–51)
HGB BLD-MCNC: 14.5 G/DL (ref 13–17.7)
IMM GRANULOCYTES # BLD AUTO: 0.03 10*3/MM3 (ref 0–0.05)
IMM GRANULOCYTES NFR BLD AUTO: 0.3 % (ref 0–0.5)
LYMPHOCYTES # BLD AUTO: 3.02 10*3/MM3 (ref 0.7–3.1)
LYMPHOCYTES NFR BLD AUTO: 32.7 % (ref 19.6–45.3)
MCH RBC QN AUTO: 30.5 PG (ref 26.6–33)
MCHC RBC AUTO-ENTMCNC: 32.9 G/DL (ref 31.5–35.7)
MCV RBC AUTO: 92.8 FL (ref 79–97)
MONOCYTES # BLD AUTO: 0.66 10*3/MM3 (ref 0.1–0.9)
MONOCYTES NFR BLD AUTO: 7.2 % (ref 5–12)
NEUTROPHILS NFR BLD AUTO: 5.18 10*3/MM3 (ref 1.7–7)
NEUTROPHILS NFR BLD AUTO: 56.1 % (ref 42.7–76)
NRBC BLD AUTO-RTO: 0 /100 WBC (ref 0–0.2)
PLATELET # BLD AUTO: 310 10*3/MM3 (ref 140–450)
PMV BLD AUTO: 10.3 FL (ref 6–12)
RBC # BLD AUTO: 4.75 10*6/MM3 (ref 4.14–5.8)
WBC NRBC COR # BLD: 9.23 10*3/MM3 (ref 3.4–10.8)

## 2022-10-27 PROCEDURE — 99214 OFFICE O/P EST MOD 30 MIN: CPT | Performed by: NURSE PRACTITIONER

## 2022-10-27 PROCEDURE — 82550 ASSAY OF CK (CPK): CPT | Performed by: NURSE PRACTITIONER

## 2022-10-27 PROCEDURE — 83735 ASSAY OF MAGNESIUM: CPT | Performed by: NURSE PRACTITIONER

## 2022-10-27 PROCEDURE — 93000 ELECTROCARDIOGRAM COMPLETE: CPT | Performed by: NURSE PRACTITIONER

## 2022-10-27 PROCEDURE — 84550 ASSAY OF BLOOD/URIC ACID: CPT | Performed by: NURSE PRACTITIONER

## 2022-10-27 PROCEDURE — 80053 COMPREHEN METABOLIC PANEL: CPT | Performed by: NURSE PRACTITIONER

## 2022-10-27 PROCEDURE — 84484 ASSAY OF TROPONIN QUANT: CPT | Performed by: NURSE PRACTITIONER

## 2022-10-27 PROCEDURE — 82553 CREATINE MB FRACTION: CPT | Performed by: NURSE PRACTITIONER

## 2022-10-27 PROCEDURE — 81256 HFE GENE: CPT | Performed by: NURSE PRACTITIONER

## 2022-10-27 PROCEDURE — 85025 COMPLETE CBC W/AUTO DIFF WBC: CPT | Performed by: NURSE PRACTITIONER

## 2022-10-27 RX ORDER — LISINOPRIL 10 MG/1
TABLET ORAL
Qty: 30 TABLET | Refills: 0 | Status: SHIPPED | OUTPATIENT
Start: 2022-10-27 | End: 2023-01-06

## 2022-10-27 NOTE — PROGRESS NOTES
Chief Complaint  Abdominal Pain, Hypertension, and Palpitations    Subjective            Kip Oliveira presents to [facility]  History of Present Illness     He states that he and his wife went to [place] in April/May - when they came back his wife was really sick, but he wasn't. Then his LISA passed away, so they went back to [place]. When they came back this time he was sick, but she wasn't. He states following the illness everything that he would eat would give him gas - not stinky gas, but 'volumous' gas. It went on for weeks.     He states in 1994 he discovered that he had a problem with his vagus nerve - he states that 'they assumed that my stomach is over-sensitive to things and that it would stimulate my vagus nerve'. It was at that time that he was put on Nexiux, Prilosec, and then Aciphex. Finally, he was put on Protonix and he has taken that for year.     Last week he started to have palpitations and gas - he tried the usual - Gaviscon, and that helped. He had a bowel movement and gas went away. Then on Wednesday it happened again, he repeated the same, and all was okay. Then, on Thursday afternoon it happened again - the palpitations lasted for 3-4 hours and concerned him to the extent that he considered to go to the ER, but he didn't. Friday he was okay. But then on Saturday, at 4AM he woke up not feeling right. He took his blood pressure and it was 149/80 -had a BM and blood pressure was still in the 140s over 90s. Two hours later it was still 149 over 84. He had three bowel movements in between that time.     He has been checking his blood pressure daily since and it is still running high - last night he at supper, felt some pressure, and some 'minor palpitations' and it was 157/87. He took Mylanta, checked it 2 hours later and it was 133/79. He feels like it is his stomach causing the issue and not his heart.     His most recent cardiac evaluation was 10-12 years ago. His last  EGD was done around the same time. He is concerned that he could have an infection in his stomach.     Two years ago his son . Eleven years ago his daughter decided that she was going to be a drunk. They have been paying her bills for the past 9 years. Last month he decided to stop paying her bills. He has a doctor friend who recommended that he go on antidepressants. He does not feel depressed. He does feel grief regarding the poor relationship he has with his daughter - the last time she talked to her was in August when he gave her a check - but he doesn't feel like the two are related.     He feels like the pantoprazole is not working. He stopped taking it. He has found that when he is having one of these attacks that Gaviscon and Mylanta works best.     He denies any chest pain - he does get dizziness with the palpitations, but not so much that he couldn't function - no shortness of breath or swelling in legs. Denies any diaphoresis. He is not having nausea or vomiting. He gets diarrhea with Mylanta or Gaviscon, but otherwise no diarrhea or constipation. No blood in the stool. No syncope or pre-syncope.     Past Medical History:   Diagnosis Date   • GERD (gastroesophageal reflux disease)    • Hyperlipidemia        Allergies   Allergen Reactions   • Sulfa Antibiotics Anaphylaxis     High fevers and heart issues        • Naproxen Sodium Itching   • Keflex [Cephalexin] Itching        Past Surgical History:   Procedure Laterality Date   • COLONOSCOPY     • VENOUS ABLATION          Social History     Tobacco Use   • Smoking status: Never   • Smokeless tobacco: Never   Vaping Use   • Vaping Use: Never used   Substance Use Topics   • Alcohol use: No   • Drug use: No       Family History   Problem Relation Age of Onset   • Cancer Mother    • Cancer Father         Health Maintenance Due   Topic Date Due   • TDAP/TD VACCINES (1 - Tdap) Never done   • ZOSTER VACCINE (1 of 2) Never done        Current Outpatient  Medications on File Prior to Visit   Medication Sig   • ezetimibe (Zetia) 10 MG tablet Take 1 tablet by mouth Daily.   • ibuprofen (ADVIL,MOTRIN) 800 MG tablet Take 1 tablet by mouth Every 8 (Eight) Hours As Needed for Mild Pain.   • allopurinol (ZYLOPRIM) 100 MG tablet Take 1 tablet by mouth Daily.   • loratadine (Allergy Relief) 10 MG tablet Take 1 tablet by mouth Daily.   • pantoprazole (PROTONIX) 40 MG EC tablet Take 1 tablet by mouth Daily As Needed (heartburn/acid reflux symptoms).   • sildenafil (REVATIO) 20 MG tablet Take one tablet by mouth PRN prior to sexual activity     No current facility-administered medications on file prior to visit.         There is no immunization history on file for this patient.    Review of Systems     Objective     /100   Pulse 84   Temp 97.4 °F (36.3 °C)   Wt 126 kg (278 lb)   SpO2 98%   BMI 41.05 kg/m²       Physical Exam  Vitals reviewed.   Constitutional:       General: He is not in acute distress.     Appearance: He is well-developed. He is obese.   HENT:      Head: Normocephalic and atraumatic.   Eyes:      General: No scleral icterus.     Extraocular Movements: Extraocular movements intact.      Conjunctiva/sclera: Conjunctivae normal.      Pupils: Pupils are equal, round, and reactive to light.   Neck:      Thyroid: No thyroid mass, thyromegaly or thyroid tenderness.      Vascular: No carotid bruit.      Trachea: Trachea normal.   Cardiovascular:      Rate and Rhythm: Normal rate and regular rhythm.      Pulses: Normal pulses.      Heart sounds: No murmur heard.  Pulmonary:      Effort: Pulmonary effort is normal. No respiratory distress.      Breath sounds: Normal breath sounds. No wheezing, rhonchi or rales.   Abdominal:      General: Bowel sounds are normal. There is no distension or abdominal bruit.      Palpations: Abdomen is soft. There is no mass.      Tenderness: There is no abdominal tenderness. There is no guarding or rebound.   Musculoskeletal:          General: Normal range of motion.      Cervical back: Normal range of motion and neck supple.      Right lower leg: No edema.      Left lower leg: No edema.   Lymphadenopathy:      Cervical: No cervical adenopathy.   Skin:     General: Skin is warm and dry.   Neurological:      Mental Status: He is alert and oriented to person, place, and time.   Psychiatric:         Mood and Affect: Mood and affect normal.         Behavior: Behavior normal.         Thought Content: Thought content normal.         Judgment: Judgment normal.         Result Review :     The following data was reviewed by: GABI Echeverria on 10/27/2022:    CMP    CMP 3/2/22 9/20/22   Glucose 98 94   BUN 13 16   Creatinine 1.03 0.96   Sodium 137 138   Potassium 4.4 4.3   Chloride 101 102   Calcium 9.8 9.8   Albumin 4.10 4.20   Total Bilirubin 0.4 0.5   Alkaline Phosphatase 110 107   AST (SGOT) 12 17   ALT (SGPT) 16 16           CBC    CBC 12/1/21 3/2/22 9/20/22   WBC 10.18 6.55 8.56   RBC 4.71 4.90 4.67   Hemoglobin 14.6 14.9 14.1   Hematocrit 42.2 45.2 42.5   MCV 89.6 92.2 91.0   MCH 31.0 30.4 30.2   MCHC 34.6 33.0 33.2   RDW 12.5 12.4 12.9   Platelets 308 279 288           Lipid Panel    Lipid Panel 3/2/22 9/20/22   Total Cholesterol 455 (A) 223 (A)   Triglycerides 167 (A) 164 (A)   HDL Cholesterol 56 61 (A)   VLDL Cholesterol 35 29   LDL Cholesterol  364 (A) 133 (A)   LDL/HDL Ratio 6.53 2.12   (A) Abnormal value            TSH    TSH 3/2/22   TSH 1.480           A1C Last 3 Results    HGBA1C Last 3 Results 3/2/22   Hemoglobin A1C 5.40           PSA    PSA 3/2/22   PSA 0.922                    ECG 12 Lead    Date/Time: 10/27/2022 2:31 PM  Performed by: Connie Castaneda APRN  Authorized by: Connie Castaneda APRN   Comparison: not compared with previous ECG   Previous ECG: no previous ECG available  Rhythm: sinus rhythm  Rate: normal  BPM: 65  Conduction: conduction normal  ST Segments: ST segments normal  T Waves: T waves  normal  QRS axis: normal  Other: no other findings    Clinical impression: normal ECG                Assessment and Plan      Diagnoses and all orders for this visit:    1. Essential hypertension (Primary)  -     CBC Auto Differential  -     Comprehensive Metabolic Panel  -     CK  -     CK MB  -     Troponin  -     Magnesium  -     Ambulatory Referral to Cardiology  -     lisinopril (PRINIVIL,ZESTRIL) 10 MG tablet; Take 1 PO daily PRN for blood pressure >135/85  Dispense: 30 tablet; Refill: 0    2. Palpitations  -     ECG 12 Lead  -     CBC Auto Differential  -     Comprehensive Metabolic Panel  -     CK  -     CK MB  -     Troponin  -     Magnesium  -     Ambulatory Referral to Cardiology  -     Holter monitor - 48 hour; Future    3. Gastroesophageal reflux disease, unspecified whether esophagitis present  -     H. Pylori Antigen, Stool - Stool, Per Rectum; Future  -     Cancel: Ambulatory Referral to Gastroenterology  -     Ambulatory Referral to Gastroenterology    4. Dyspepsia  -     H. Pylori Antigen, Stool - Stool, Per Rectum; Future  -     Cancel: Ambulatory Referral to Gastroenterology  -     Ambulatory Referral to Gastroenterology    5. Flatulence, eructation and gas pain  -     H. Pylori Antigen, Stool - Stool, Per Rectum; Future  -     Cancel: Ambulatory Referral to Gastroenterology  -     Ambulatory Referral to Gastroenterology    6. Influenza vaccination declined by patient    7. Elevated ferritin  -     Hemochromatosis Mutation            Follow Up     Return if symptoms worsen or fail to improve.  Patient to go to the emergency room with any persistent palpitations or development of chest pain.    EKG is normal on exam today.  I will get a 48-hour Holter monitor and refer to cardiology to further assess.  It has been quite sometime since his last cardiac evaluation.  We discussed the need for antihypertensive medication.  He states that I may prescribe the medication to him, but he does not plan to  take it daily, but rather on an as needed basis.  He has not had any issue with hypertension previously, only recently.  He will contact me if blood pressures are persistently greater than 135/85, even with the medication.    I will also refer to gastroenterology, but in the interim get an H. pylori stool antigen.  Breath test deferred due to longstanding history of PPI therapy.    Patient was given instructions and counseling regarding his condition or for health maintenance advice. Please see specific information pulled into the AVS if appropriate.

## 2022-10-27 NOTE — PROGRESS NOTES
Venipuncture Blood Specimen Collection  Venipuncture performed in left arm by Yesy Seth with good hemostasis. Patient tolerated the procedure well without complications.   10/27/22   Yesy Seth

## 2022-10-28 LAB
ALBUMIN SERPL-MCNC: 4.3 G/DL (ref 3.5–5.2)
ALBUMIN/GLOB SERPL: 1.4 G/DL
ALP SERPL-CCNC: 111 U/L (ref 39–117)
ALT SERPL W P-5'-P-CCNC: 18 U/L (ref 1–41)
ANION GAP SERPL CALCULATED.3IONS-SCNC: 11 MMOL/L (ref 5–15)
AST SERPL-CCNC: 20 U/L (ref 1–40)
BILIRUB SERPL-MCNC: 0.3 MG/DL (ref 0–1.2)
BUN SERPL-MCNC: 16 MG/DL (ref 8–23)
BUN/CREAT SERPL: 17.4 (ref 7–25)
CALCIUM SPEC-SCNC: 9.9 MG/DL (ref 8.6–10.5)
CHLORIDE SERPL-SCNC: 100 MMOL/L (ref 98–107)
CK MB SERPL-CCNC: 2.12 NG/ML
CK SERPL-CCNC: 56 U/L (ref 20–200)
CO2 SERPL-SCNC: 25 MMOL/L (ref 22–29)
CREAT SERPL-MCNC: 0.92 MG/DL (ref 0.76–1.27)
EGFRCR SERPLBLD CKD-EPI 2021: 94.1 ML/MIN/1.73
GLOBULIN UR ELPH-MCNC: 3.1 GM/DL
GLUCOSE SERPL-MCNC: 91 MG/DL (ref 65–99)
MAGNESIUM SERPL-MCNC: 2.2 MG/DL (ref 1.6–2.4)
POTASSIUM SERPL-SCNC: 4 MMOL/L (ref 3.5–5.2)
PROT SERPL-MCNC: 7.4 G/DL (ref 6–8.5)
SODIUM SERPL-SCNC: 136 MMOL/L (ref 136–145)
TROPONIN T SERPL-MCNC: <0.01 NG/ML (ref 0–0.03)
URATE SERPL-MCNC: 6 MG/DL (ref 3.4–7)

## 2022-11-01 ENCOUNTER — CLINICAL SUPPORT (OUTPATIENT)
Dept: FAMILY MEDICINE CLINIC | Facility: CLINIC | Age: 62
End: 2022-11-01

## 2022-11-01 DIAGNOSIS — R14.2 FLATULENCE, ERUCTATION AND GAS PAIN: ICD-10-CM

## 2022-11-01 DIAGNOSIS — R14.1 FLATULENCE, ERUCTATION AND GAS PAIN: ICD-10-CM

## 2022-11-01 DIAGNOSIS — K21.9 GASTROESOPHAGEAL REFLUX DISEASE, UNSPECIFIED WHETHER ESOPHAGITIS PRESENT: ICD-10-CM

## 2022-11-01 DIAGNOSIS — R14.3 FLATULENCE, ERUCTATION AND GAS PAIN: ICD-10-CM

## 2022-11-01 DIAGNOSIS — R10.13 DYSPEPSIA: ICD-10-CM

## 2022-11-01 LAB — H. PYLORI ANTIGEN STOOL: NEGATIVE

## 2022-11-01 PROCEDURE — 87338 HPYLORI STOOL AG IA: CPT | Performed by: NURSE PRACTITIONER

## 2022-11-03 LAB — HFE GENE MUT ANL BLD/T: NORMAL

## 2022-11-04 ENCOUNTER — OFFICE VISIT (OUTPATIENT)
Dept: CARDIOLOGY | Facility: CLINIC | Age: 62
End: 2022-11-04

## 2022-11-04 VITALS
BODY MASS INDEX: 41.32 KG/M2 | SYSTOLIC BLOOD PRESSURE: 170 MMHG | HEIGHT: 69 IN | HEART RATE: 54 BPM | WEIGHT: 279 LBS | DIASTOLIC BLOOD PRESSURE: 88 MMHG

## 2022-11-04 DIAGNOSIS — R00.2 PALPITATIONS: Primary | ICD-10-CM

## 2022-11-04 DIAGNOSIS — I10 HYPERTENSION, ESSENTIAL: ICD-10-CM

## 2022-11-04 DIAGNOSIS — I47.1 PSVT (PAROXYSMAL SUPRAVENTRICULAR TACHYCARDIA): ICD-10-CM

## 2022-11-04 PROCEDURE — 99203 OFFICE O/P NEW LOW 30 MIN: CPT | Performed by: SPECIALIST

## 2022-11-04 RX ORDER — CARVEDILOL 6.25 MG/1
6.25 TABLET ORAL 2 TIMES DAILY
Qty: 180 TABLET | Refills: 3 | Status: SHIPPED | OUTPATIENT
Start: 2022-11-04 | End: 2022-11-15

## 2022-11-04 NOTE — PROGRESS NOTES
[facility]   Cardiology Consult Note    Patient Name: Kip Oliveira  : 1960  Referring Physician: KING Echeverria*  Subjective   Subjective     Reason for Consult/ Chief Complaint:   Chief Complaint   Patient presents with   • Palpitations     Only 2 episode here recently        HPI:  Kip Oliveira is a 62 y.o. male with history of palpitations on and off for several years.  Increased recently.  No chest pain.  Shortness of breath occasional.  Blood pressures running high recently.    Review of Systems:    Constitutional no fever,  no weight loss   Skin no rash   Otolaryngeal no difficulty swallowing   Cardiovascular See HPI   Pulmonary no cough, no sputum production   Gastrointestinal no constipation, no diarrhea   Genitourinary no dysuria, no hematuria   Hematologic no easy bruisability, no abnormal bleeding   Musculoskeletal no muscle pain   Neurologic no dizziness, no falls       Personal History     Past Medical History:  Past Medical History:   Diagnosis Date   • GERD (gastroesophageal reflux disease)    • Hyperlipidemia        Family History:   Family History   Problem Relation Age of Onset   • Cancer Mother    • Cancer Father        Social History:  reports that he has never smoked. He has never used smokeless tobacco. He reports that he does not drink alcohol and does not use drugs.    Home Medications:  allopurinol, ezetimibe, ibuprofen, lisinopril, loratadine, pantoprazole, and sildenafil    Allergies:  Allergies   Allergen Reactions   • Sulfa Antibiotics Anaphylaxis     High fevers and heart issues        • Naproxen Sodium Itching   • Keflex [Cephalexin] Itching       Objective    Objective     Vitals:   Heart Rate:  [54-60] 54  BP: (170-180)/(88-96) 170/88  Body mass index is 41.2 kg/m².  PHYSICAL EXAM:    General Appearance:   · well developed  · well nourished  HENT:   · oropharynx moist  · lips not cyanotic  Neck:  · thyroid not enlarged  · supple  Respiratory:  · no  respiratory distress  · normal breath sounds  · no rales  Cardiovascular:  · no jugular venous distention  · regular rhythm  · apical impulse normal  · S1 normal, S2 normal  · no S3, no S4   · no murmur  · no rub, no thrill  · carotid pulses normal; no bruit  · pedal pulses normal  · lower extremity edema: none    Skin:   · warm, dry  Psychiatric:  · judgement and insight appropriate  · normal mood and affect    RESULTS:    EKG reviewed by me and shows sinus rhythm.       Result Review    Result Review:  I have personally reviewed the available results:  [x]  Laboratory  [x]  EKG/Telemetry   [x]  Cardiology/Vascular   [x] Medications  [x]  Old records  Lab Results   Component Value Date    CHOL 223 (H) 09/20/2022    CHOL 455 (H) 03/02/2022     Lab Results   Component Value Date    TRIG 164 (H) 09/20/2022    TRIG 167 (H) 03/02/2022    TRIG 93 01/02/2020     Lab Results   Component Value Date    HDL 61 (H) 09/20/2022    HDL 56 03/02/2022    HDL 69 01/02/2020     Lab Results   Component Value Date     (H) 09/20/2022     (H) 03/02/2022     (H) 01/02/2020     Lab Results   Component Value Date    VLDL 29 09/20/2022    VLDL 35 03/02/2022    VLDL 19 01/02/2020         Procedures     Impression/Plan      1.   Palpitations/PSVT: Start Coreg 6.25 mg twice daily.  24-hour Holter to evaluate for any arrhythmias.  Echocardiogram.  2.  Essential hypertension uncontrolled: Start carvedilol 6.25 mg twice a day.  Continue lisinopril 10 mg a day if blood pressure is high.  3.  Hyperlipidemia: Continue Zetia 10 mg a day.  Will probably need to add statins if LDL is persistently high.        Electronically signed by Clifford Riggs MD, 11/04/22, 10:47 AM EDT.

## 2022-11-15 ENCOUNTER — OFFICE VISIT (OUTPATIENT)
Dept: GASTROENTEROLOGY | Facility: CLINIC | Age: 62
End: 2022-11-15

## 2022-11-15 ENCOUNTER — PREP FOR SURGERY (OUTPATIENT)
Dept: SURGERY | Facility: SURGERY CENTER | Age: 62
End: 2022-11-15

## 2022-11-15 VITALS
TEMPERATURE: 97.5 F | HEIGHT: 69 IN | DIASTOLIC BLOOD PRESSURE: 86 MMHG | OXYGEN SATURATION: 98 % | BODY MASS INDEX: 41.77 KG/M2 | HEART RATE: 69 BPM | WEIGHT: 282 LBS | SYSTOLIC BLOOD PRESSURE: 160 MMHG

## 2022-11-15 DIAGNOSIS — K21.9 GASTROESOPHAGEAL REFLUX DISEASE, UNSPECIFIED WHETHER ESOPHAGITIS PRESENT: Primary | ICD-10-CM

## 2022-11-15 DIAGNOSIS — R19.8 LUQ FULLNESS: ICD-10-CM

## 2022-11-15 DIAGNOSIS — K22.4 ESOPHAGEAL SPASM: ICD-10-CM

## 2022-11-15 PROCEDURE — 99214 OFFICE O/P EST MOD 30 MIN: CPT | Performed by: NURSE PRACTITIONER

## 2022-11-15 RX ORDER — SODIUM CHLORIDE 0.9 % (FLUSH) 0.9 %
3 SYRINGE (ML) INJECTION EVERY 12 HOURS SCHEDULED
Status: CANCELLED | OUTPATIENT
Start: 2022-11-15

## 2022-11-15 RX ORDER — SODIUM CHLORIDE 0.9 % (FLUSH) 0.9 %
10 SYRINGE (ML) INJECTION AS NEEDED
Status: CANCELLED | OUTPATIENT
Start: 2022-11-15

## 2022-11-15 RX ORDER — SODIUM CHLORIDE, SODIUM LACTATE, POTASSIUM CHLORIDE, CALCIUM CHLORIDE 600; 310; 30; 20 MG/100ML; MG/100ML; MG/100ML; MG/100ML
30 INJECTION, SOLUTION INTRAVENOUS CONTINUOUS PRN
Status: CANCELLED | OUTPATIENT
Start: 2022-11-15

## 2022-11-15 NOTE — H&P (VIEW-ONLY)
"Chief Complaint   Patient presents with   • GI Problem         History of Present Illness  Patient is a 62-year-old male who presents today for evaluation.  He was referred for GERD, dyspepsia, and gas.  H. pylori stool antigen testing was performed and was negative.  He had a Cologuard for colon cancer screening in 2021 that was negative.    Patient presents today for evaluation.  He reports he had an EGD and colonoscopy at age 50 with Dr. Cordoba.  Reports EGD was negative and colonoscopy showed only benign polyps.  He had difficulty with hemorrhoids following the colonoscopy and thus elected to do the Cologuard last year for screening.    Patient reports he has been experiencing heart palpitations.  These always are associated with eating.  If he has excessive gas, he will experience the palpitations.  He had similar symptoms in the 1990s.  He reports occasional heartburn and he also notes fullness to his left upper quadrant.  He reports at times when he belches he will taste food he ate many hours earlier.    He denies any nausea, vomiting, or dysphagia.  Denies any abdominal pain.  Denies any bowel changes, blood in the stool, or dark stool.    He has previously been worked up by cardiology with no source of symptoms found.  He recently saw a new cardiologist and will be undergoing an echocardiogram and Holter monitor.     Result Review :           Vital Signs:   /86   Pulse 69   Temp 97.5 °F (36.4 °C)   Ht 175.3 cm (69\")   Wt 128 kg (282 lb)   SpO2 98%   BMI 41.64 kg/m²     Body mass index is 41.64 kg/m².     Physical Exam  Vitals reviewed.   Constitutional:       General: He is not in acute distress.     Appearance: He is well-developed.   HENT:      Head: Normocephalic and atraumatic.   Pulmonary:      Effort: Pulmonary effort is normal. No respiratory distress.   Abdominal:      General: Abdomen is flat. Bowel sounds are normal. There is no distension.      Palpations: Abdomen is soft.      " Tenderness: There is no abdominal tenderness.   Skin:     General: Skin is dry.      Coloration: Skin is not pale.   Neurological:      Mental Status: He is alert and oriented to person, place, and time.   Psychiatric:         Thought Content: Thought content normal.           Assessment and Plan    Diagnoses and all orders for this visit:    1. Gastroesophageal reflux disease, unspecified whether esophagitis present (Primary)  -     NM Gastric Emptying; Future    2. Esophageal spasm  -     NM Gastric Emptying; Future    3. LUQ fullness  -     NM Gastric Emptying; Future         Discussion  Patient presents today for evaluation with concerns about heart palpitations that relate to eating.  Discussed with patient today as he reports previous cardiac work-up was negative, and what he is feeling could potentially be esophageal spasms and not heart palpitations.  Patient also with left upper quadrant fullness and occasional reflux.  Recommended EGD for further evaluation of symptoms.  We will also schedule gastric emptying study to assess for gastroparesis, question if this could be contributing to the fullness and if gastric distention could be stimulating the vagus nerve which could be contributing to his symptoms.  If EGD and gastric emptying study negative and symptoms persist, we may consider esophageal manometry and 24-hour pH impedance testing.          Follow Up   Return for Follow up to review results after testing complete.    Patient Instructions   Schedule EGD for further evaluation of symptoms.     Schedule gastric emptying study to assess for gastroparesis.

## 2022-11-16 ENCOUNTER — HOSPITAL ENCOUNTER (OUTPATIENT)
Facility: SURGERY CENTER | Age: 62
Setting detail: HOSPITAL OUTPATIENT SURGERY
Discharge: HOME | End: 2022-11-16
Attending: INTERNAL MEDICINE | Admitting: INTERNAL MEDICINE

## 2022-11-16 ENCOUNTER — ANESTHESIA EVENT (OUTPATIENT)
Dept: SURGERY | Facility: SURGERY CENTER | Age: 62
End: 2022-11-16

## 2022-11-16 ENCOUNTER — ANESTHESIA (OUTPATIENT)
Dept: SURGERY | Facility: SURGERY CENTER | Age: 62
End: 2022-11-16

## 2022-11-16 VITALS
DIASTOLIC BLOOD PRESSURE: 89 MMHG | WEIGHT: 271 LBS | TEMPERATURE: 98.2 F | HEIGHT: 71 IN | OXYGEN SATURATION: 99 % | BODY MASS INDEX: 37.94 KG/M2 | RESPIRATION RATE: 18 BRPM | HEART RATE: 62 BPM | SYSTOLIC BLOOD PRESSURE: 134 MMHG

## 2022-11-16 DIAGNOSIS — R19.8 LUQ FULLNESS: ICD-10-CM

## 2022-11-16 DIAGNOSIS — K21.9 GASTROESOPHAGEAL REFLUX DISEASE, UNSPECIFIED WHETHER ESOPHAGITIS PRESENT: ICD-10-CM

## 2022-11-16 DIAGNOSIS — K22.4 ESOPHAGEAL SPASM: ICD-10-CM

## 2022-11-16 PROCEDURE — 43239 EGD BIOPSY SINGLE/MULTIPLE: CPT | Performed by: INTERNAL MEDICINE

## 2022-11-16 PROCEDURE — 88305 TISSUE EXAM BY PATHOLOGIST: CPT | Performed by: INTERNAL MEDICINE

## 2022-11-16 PROCEDURE — 25010000002 PROPOFOL 10 MG/ML EMULSION: Performed by: ANESTHESIOLOGY

## 2022-11-16 RX ORDER — SODIUM CHLORIDE 0.9 % (FLUSH) 0.9 %
3 SYRINGE (ML) INJECTION EVERY 12 HOURS SCHEDULED
Status: DISCONTINUED | OUTPATIENT
Start: 2022-11-16 | End: 2022-11-16 | Stop reason: HOSPADM

## 2022-11-16 RX ORDER — SODIUM CHLORIDE 0.9 % (FLUSH) 0.9 %
10 SYRINGE (ML) INJECTION AS NEEDED
Status: DISCONTINUED | OUTPATIENT
Start: 2022-11-16 | End: 2022-11-16 | Stop reason: HOSPADM

## 2022-11-16 RX ORDER — SODIUM CHLORIDE, SODIUM LACTATE, POTASSIUM CHLORIDE, CALCIUM CHLORIDE 600; 310; 30; 20 MG/100ML; MG/100ML; MG/100ML; MG/100ML
30 INJECTION, SOLUTION INTRAVENOUS CONTINUOUS PRN
Status: DISCONTINUED | OUTPATIENT
Start: 2022-11-16 | End: 2022-11-16 | Stop reason: HOSPADM

## 2022-11-16 RX ORDER — OMEPRAZOLE 20 MG/1
20 CAPSULE, DELAYED RELEASE ORAL DAILY
COMMUNITY
End: 2023-03-17 | Stop reason: SDUPTHER

## 2022-11-16 RX ORDER — PROPOFOL 10 MG/ML
VIAL (ML) INTRAVENOUS AS NEEDED
Status: DISCONTINUED | OUTPATIENT
Start: 2022-11-16 | End: 2022-11-16 | Stop reason: SURG

## 2022-11-16 RX ADMIN — PROPOFOL 150 MG: 10 INJECTION, EMULSION INTRAVENOUS at 09:22

## 2022-11-16 RX ADMIN — PROPOFOL 250 MCG/KG/MIN: 10 INJECTION, EMULSION INTRAVENOUS at 09:22

## 2022-11-16 RX ADMIN — SODIUM CHLORIDE, POTASSIUM CHLORIDE, SODIUM LACTATE AND CALCIUM CHLORIDE 30 ML/HR: 600; 310; 30; 20 INJECTION, SOLUTION INTRAVENOUS at 09:04

## 2022-11-16 NOTE — ANESTHESIA PREPROCEDURE EVALUATION
Anesthesia Evaluation     Patient summary reviewed and Nursing notes reviewed   NPO Solid Status: > 8 hours  NPO Liquid Status: > 2 hours           Airway   Mallampati: II  TM distance: >3 FB  Neck ROM: full  No difficulty expected  Dental - normal exam     Pulmonary - negative pulmonary ROS and normal exam   Cardiovascular - normal exam  Exercise tolerance: good (4-7 METS)    (+) dysrhythmias Tachycardia, hyperlipidemia,       Neuro/Psych  (+) headaches,    GI/Hepatic/Renal/Endo    (+) obesity,  GERD,      Musculoskeletal (-) negative ROS    Abdominal   (+) obese,     Bowel sounds: normal.   Substance History - negative use     OB/GYN negative ob/gyn ROS         Other                        Anesthesia Plan    ASA 3     MAC     intravenous induction     Anesthetic plan, risks, benefits, and alternatives have been provided, discussed and informed consent has been obtained with: patient.  Pre-procedure education provided      CODE STATUS:

## 2022-11-16 NOTE — ANESTHESIA POSTPROCEDURE EVALUATION
Patient: Kip Oliveira    Procedure Summary     Date: 11/16/22 Room / Location: [facility] OR  / [facility]    Anesthesia Start: 0922 Anesthesia Stop: 0936    Procedure: ESOPHAGOGASTRODUODENOSCOPY Diagnosis:       Gastroesophageal reflux disease, unspecified whether esophagitis present      Esophageal spasm      LUQ fullness      (Gastroesophageal reflux disease, unspecified whether esophagitis present [K21.9])      (Esophageal spasm [K22.4])      (LUQ fullness [R19.8])    Surgeons: Carlos Alberto Cordoba MD Provider: Hayden Valverde MD    Anesthesia Type: MAC ASA Status: 3          Anesthesia Type: MAC    Vitals  Vitals Value Taken Time   /89 11/16/22 0950   Temp 36.8 °C (98.2 °F) 11/16/22 0935   Pulse 65 11/16/22 0950   Resp 18 11/16/22 0950   SpO2 98 % 11/16/22 0950           Post Anesthesia Care and Evaluation    Patient location during evaluation: bedside  Patient participation: complete - patient participated  Level of consciousness: awake  Pain management: adequate    Airway patency: patent  Anesthetic complications: No anesthetic complications  PONV Status: none  Cardiovascular status: acceptable  Respiratory status: acceptable  Hydration status: acceptable  Post Neuraxial Block status: Motor and sensory function returned to baseline

## 2022-11-17 LAB
LAB AP CASE REPORT: NORMAL
LAB AP CLINICAL INFORMATION: NORMAL
PATH REPORT.FINAL DX SPEC: NORMAL
PATH REPORT.GROSS SPEC: NORMAL

## 2022-11-18 DIAGNOSIS — B37.81 CANDIDAL ESOPHAGITIS: Primary | ICD-10-CM

## 2022-11-18 RX ORDER — FLUCONAZOLE 100 MG/1
TABLET ORAL
Qty: 22 TABLET | Refills: 0 | Status: SHIPPED | OUTPATIENT
Start: 2022-11-18 | End: 2023-03-17

## 2022-11-30 ENCOUNTER — APPOINTMENT (OUTPATIENT)
Dept: CARDIOLOGY | Facility: HOSPITAL | Age: 62
End: 2022-11-30

## 2022-12-09 ENCOUNTER — APPOINTMENT (OUTPATIENT)
Dept: NUCLEAR MEDICINE | Facility: HOSPITAL | Age: 62
End: 2022-12-09

## 2022-12-30 DIAGNOSIS — I10 ESSENTIAL HYPERTENSION: ICD-10-CM

## 2022-12-30 RX ORDER — LISINOPRIL 10 MG/1
TABLET ORAL
Qty: 30 TABLET | Refills: 0 | OUTPATIENT
Start: 2022-12-30

## 2023-01-06 DIAGNOSIS — I10 ESSENTIAL HYPERTENSION: ICD-10-CM

## 2023-01-06 RX ORDER — LISINOPRIL 10 MG/1
TABLET ORAL
Qty: 90 TABLET | Refills: 0 | Status: SHIPPED | OUTPATIENT
Start: 2023-01-06 | End: 2023-03-17 | Stop reason: SDUPTHER

## 2023-03-17 ENCOUNTER — OFFICE VISIT (OUTPATIENT)
Dept: FAMILY MEDICINE CLINIC | Facility: CLINIC | Age: 63
End: 2023-03-17
Payer: COMMERCIAL

## 2023-03-17 VITALS
DIASTOLIC BLOOD PRESSURE: 82 MMHG | BODY MASS INDEX: 39.62 KG/M2 | HEIGHT: 71 IN | OXYGEN SATURATION: 98 % | HEART RATE: 90 BPM | SYSTOLIC BLOOD PRESSURE: 148 MMHG | TEMPERATURE: 96.7 F | WEIGHT: 283 LBS

## 2023-03-17 DIAGNOSIS — M1A.00X0 CHRONIC PRIMARY GOUT: ICD-10-CM

## 2023-03-17 DIAGNOSIS — E78.5 HYPERLIPIDEMIA, UNSPECIFIED HYPERLIPIDEMIA TYPE: ICD-10-CM

## 2023-03-17 DIAGNOSIS — Z12.5 PROSTATE CANCER SCREENING: ICD-10-CM

## 2023-03-17 DIAGNOSIS — G89.29 CHRONIC PAIN OF RIGHT KNEE: ICD-10-CM

## 2023-03-17 DIAGNOSIS — K21.9 GASTROESOPHAGEAL REFLUX DISEASE WITHOUT ESOPHAGITIS: ICD-10-CM

## 2023-03-17 DIAGNOSIS — M25.561 CHRONIC PAIN OF RIGHT KNEE: ICD-10-CM

## 2023-03-17 DIAGNOSIS — I10 ESSENTIAL HYPERTENSION: Primary | ICD-10-CM

## 2023-03-17 DIAGNOSIS — R09.89 CHRONIC SINUS COMPLAINTS: ICD-10-CM

## 2023-03-17 LAB
ALBUMIN SERPL-MCNC: 4.4 G/DL (ref 3.5–5.2)
ALBUMIN UR-MCNC: <1.2 MG/DL
ALBUMIN/GLOB SERPL: 1.3 G/DL
ALP SERPL-CCNC: 113 U/L (ref 39–117)
ALT SERPL W P-5'-P-CCNC: 24 U/L (ref 1–41)
ANION GAP SERPL CALCULATED.3IONS-SCNC: 13.4 MMOL/L (ref 5–15)
AST SERPL-CCNC: 25 U/L (ref 1–40)
BASOPHILS # BLD AUTO: 0.12 10*3/MM3 (ref 0–0.2)
BASOPHILS NFR BLD AUTO: 1.3 % (ref 0–1.5)
BILIRUB SERPL-MCNC: 0.5 MG/DL (ref 0–1.2)
BUN SERPL-MCNC: 18 MG/DL (ref 8–23)
BUN/CREAT SERPL: 13.6 (ref 7–25)
CALCIUM SPEC-SCNC: 10.4 MG/DL (ref 8.6–10.5)
CHLORIDE SERPL-SCNC: 97 MMOL/L (ref 98–107)
CHOLEST SERPL-MCNC: 224 MG/DL (ref 0–200)
CO2 SERPL-SCNC: 25.6 MMOL/L (ref 22–29)
CREAT SERPL-MCNC: 1.32 MG/DL (ref 0.76–1.27)
CREAT UR-MCNC: 20.6 MG/DL
DEPRECATED RDW RBC AUTO: 40.5 FL (ref 37–54)
EGFRCR SERPLBLD CKD-EPI 2021: 61 ML/MIN/1.73
EOSINOPHIL # BLD AUTO: 0.21 10*3/MM3 (ref 0–0.4)
EOSINOPHIL NFR BLD AUTO: 2.3 % (ref 0.3–6.2)
ERYTHROCYTE [DISTWIDTH] IN BLOOD BY AUTOMATED COUNT: 12.2 % (ref 12.3–15.4)
GLOBULIN UR ELPH-MCNC: 3.3 GM/DL
GLUCOSE SERPL-MCNC: 94 MG/DL (ref 65–99)
HCT VFR BLD AUTO: 46 % (ref 37.5–51)
HDLC SERPL-MCNC: 60 MG/DL (ref 40–60)
HGB BLD-MCNC: 14.9 G/DL (ref 13–17.7)
IMM GRANULOCYTES # BLD AUTO: 0.03 10*3/MM3 (ref 0–0.05)
IMM GRANULOCYTES NFR BLD AUTO: 0.3 % (ref 0–0.5)
LDLC SERPL CALC-MCNC: 145 MG/DL (ref 0–100)
LDLC/HDLC SERPL: 2.38 {RATIO}
LYMPHOCYTES # BLD AUTO: 3 10*3/MM3 (ref 0.7–3.1)
LYMPHOCYTES NFR BLD AUTO: 33.3 % (ref 19.6–45.3)
MCH RBC QN AUTO: 29.2 PG (ref 26.6–33)
MCHC RBC AUTO-ENTMCNC: 32.4 G/DL (ref 31.5–35.7)
MCV RBC AUTO: 90 FL (ref 79–97)
MICROALBUMIN/CREAT UR: NORMAL MG/G{CREAT}
MONOCYTES # BLD AUTO: 0.67 10*3/MM3 (ref 0.1–0.9)
MONOCYTES NFR BLD AUTO: 7.4 % (ref 5–12)
NEUTROPHILS NFR BLD AUTO: 4.99 10*3/MM3 (ref 1.7–7)
NEUTROPHILS NFR BLD AUTO: 55.4 % (ref 42.7–76)
NRBC BLD AUTO-RTO: 0 /100 WBC (ref 0–0.2)
PLATELET # BLD AUTO: 296 10*3/MM3 (ref 140–450)
PMV BLD AUTO: 10.5 FL (ref 6–12)
POTASSIUM SERPL-SCNC: 4.8 MMOL/L (ref 3.5–5.2)
PROT SERPL-MCNC: 7.7 G/DL (ref 6–8.5)
PSA SERPL-MCNC: 0.53 NG/ML (ref 0–4)
RBC # BLD AUTO: 5.11 10*6/MM3 (ref 4.14–5.8)
SODIUM SERPL-SCNC: 136 MMOL/L (ref 136–145)
T4 FREE SERPL-MCNC: 1.29 NG/DL (ref 0.93–1.7)
TRIGL SERPL-MCNC: 106 MG/DL (ref 0–150)
TSH SERPL DL<=0.05 MIU/L-ACNC: 1.6 UIU/ML (ref 0.27–4.2)
URATE SERPL-MCNC: 6.6 MG/DL (ref 3.4–7)
VLDLC SERPL-MCNC: 19 MG/DL (ref 5–40)
WBC NRBC COR # BLD: 9.02 10*3/MM3 (ref 3.4–10.8)

## 2023-03-17 PROCEDURE — G0103 PSA SCREENING: HCPCS | Performed by: NURSE PRACTITIONER

## 2023-03-17 PROCEDURE — 82043 UR ALBUMIN QUANTITATIVE: CPT | Performed by: NURSE PRACTITIONER

## 2023-03-17 PROCEDURE — 84439 ASSAY OF FREE THYROXINE: CPT | Performed by: NURSE PRACTITIONER

## 2023-03-17 PROCEDURE — 80061 LIPID PANEL: CPT | Performed by: NURSE PRACTITIONER

## 2023-03-17 PROCEDURE — 82570 ASSAY OF URINE CREATININE: CPT | Performed by: NURSE PRACTITIONER

## 2023-03-17 PROCEDURE — 80050 GENERAL HEALTH PANEL: CPT | Performed by: NURSE PRACTITIONER

## 2023-03-17 PROCEDURE — 99214 OFFICE O/P EST MOD 30 MIN: CPT | Performed by: NURSE PRACTITIONER

## 2023-03-17 PROCEDURE — 84550 ASSAY OF BLOOD/URIC ACID: CPT | Performed by: NURSE PRACTITIONER

## 2023-03-17 RX ORDER — LISINOPRIL 10 MG/1
TABLET ORAL
Qty: 90 TABLET | Refills: 1 | Status: SHIPPED | OUTPATIENT
Start: 2023-03-17

## 2023-03-17 RX ORDER — EZETIMIBE 10 MG/1
10 TABLET ORAL DAILY
Qty: 90 TABLET | Refills: 1 | Status: SHIPPED | OUTPATIENT
Start: 2023-03-17

## 2023-03-17 RX ORDER — LORATADINE 10 MG/1
10 TABLET ORAL DAILY
Qty: 90 TABLET | Refills: 1 | Status: SHIPPED | OUTPATIENT
Start: 2023-03-17

## 2023-03-17 RX ORDER — PANTOPRAZOLE SODIUM 40 MG/1
40 TABLET, DELAYED RELEASE ORAL DAILY PRN
Qty: 90 TABLET | Refills: 1 | Status: CANCELLED | OUTPATIENT
Start: 2023-03-17

## 2023-03-17 RX ORDER — IBUPROFEN 800 MG/1
800 TABLET ORAL EVERY 8 HOURS PRN
Qty: 270 TABLET | Refills: 0 | Status: SHIPPED | OUTPATIENT
Start: 2023-03-17

## 2023-03-17 RX ORDER — OMEPRAZOLE 40 MG/1
40 CAPSULE, DELAYED RELEASE ORAL DAILY
Qty: 90 CAPSULE | Refills: 1 | Status: SHIPPED | OUTPATIENT
Start: 2023-03-17

## 2023-03-17 RX ORDER — ALLOPURINOL 100 MG/1
100 TABLET ORAL DAILY
Qty: 90 TABLET | Refills: 1 | Status: SHIPPED | OUTPATIENT
Start: 2023-03-17

## 2023-03-17 NOTE — PROGRESS NOTES
Chief Complaint  Hypertension (Patient is here for medication refills. )    Subjective          Kip Oliveira presents to [facility]  History of Present Illness     Kip presents to the office today for follow up on chronic health conditions and medication refills.     He is still having a lot of GI issues - he has been to see GI and cardiology and they both told him that there wasn't anything wrong with him. They advised him that it was likely stress related. He is under a lot of stress. His daughter  - she was in the hospital for two weeks prior to [place] and then  close to. She passed away without insurance - he paid for her , and then he has also had to deal with her estate as her two sons have not been able to do that. He had some similar issues when he started a new job in . His most recent episodes started in August, which is when he had stopped supporting his daughter. He thinks that some of his symptoms are improving - with time - he has to avoid processed foods. He state if he eats anything that causes heartburn it is going to cause him heart palpitations. He will get heart palpitations and it will make his left arm hurt as well.     His EGD in November did show candidal esophagitis and 2cm HH, as well as esophageal mucosal variant.     48 hour holter monitor done in November showed NSR and no tachy or ekta arrhythmias. His echo showed normal LVSF, trace MR and trace TR, and trace AI.    Blood pressure on exam today is elevated at 148/82 - endorses white coat syndrome - at home his BP has been in the 120s over 70s when he checks it. He currently takes lisinopril 10mg daily.     For his GERD he is taking Protonix 40mg daily - but states it is not efficacious - he has been getting omeprazole 20mg OTC and that is working better, but he feels that an increased dose would be more efficacious.     He is taking 1/2 of an 800mg IBU daily for chronic knee  pain and 'Achilles tendon'. He cannot walk barefoot. He has not had an xray of the right ankle, and does not want one at this time - he may consider it in a few months 'when things calm down some'.    He has not had any gout flares -he takes the allopurinol for prophylaxis d/t elevated uric acid. Very remote hx of gout in the big toe - years ago when seeing Kelechi.      PHQ-2 Total Score: 0      Past Medical History:   Diagnosis Date   • GERD (gastroesophageal reflux disease)    • Hyperlipidemia    • Palpitations        Allergies   Allergen Reactions   • Sulfa Antibiotics Anaphylaxis     High fevers and heart issues        • Beta Adrenergic Blockers Mental Status Change     Significant bradycardia, somnolence, and chest pain   • Naproxen Sodium Itching   • Keflex [Cephalexin] Itching        Past Surgical History:   Procedure Laterality Date   • COLONOSCOPY     • ENDOSCOPY N/A 11/16/2022    Procedure: ESOPHAGOGASTRODUODENOSCOPY;  Surgeon: Carlos Alberto Cordoba MD;  Location: [facility] OR;  Service: Gastroenterology;  Laterality: N/A;  small HH granular mucosa    • UPPER GASTROINTESTINAL ENDOSCOPY     • VENOUS ABLATION          Social History     Tobacco Use   • Smoking status: Never   • Smokeless tobacco: Never   Vaping Use   • Vaping Use: Never used   Substance Use Topics   • Alcohol use: No   • Drug use: No       Family History   Problem Relation Age of Onset   • Cancer Mother    • Cancer Father    • Colon cancer Neg Hx    • Colon polyps Neg Hx    • Crohn's disease Neg Hx    • Irritable bowel syndrome Neg Hx    • Ulcerative colitis Neg Hx         Health Maintenance Due   Topic Date Due   • COVID-19 Vaccine (1) Never done   • TDAP/TD VACCINES (1 - Tdap) Never done   • ZOSTER VACCINE (1 of 2) Never done   • ANNUAL PHYSICAL  03/01/2023        Current Outpatient Medications on File Prior to Visit   Medication Sig   • sildenafil (REVATIO) 20 MG tablet Take one tablet by mouth PRN prior to sexual activity   •  "[DISCONTINUED] allopurinol (ZYLOPRIM) 100 MG tablet Take 1 tablet by mouth Daily.   • [DISCONTINUED] ezetimibe (Zetia) 10 MG tablet Take 1 tablet by mouth Daily.   • [DISCONTINUED] fluconazole (DIFLUCAN) 100 MG tablet Take two tablets day 1 then one tablet x 20 days. #22. No refills.   • [DISCONTINUED] ibuprofen (ADVIL,MOTRIN) 800 MG tablet Take 1 tablet by mouth Every 8 (Eight) Hours As Needed for Mild Pain.   • [DISCONTINUED] lisinopril (PRINIVIL,ZESTRIL) 10 MG tablet TAKE 1 TABLET BY MOUTH DAILY AS NEEDED FOR BLOOD PRESSURE >135/85   • [DISCONTINUED] loratadine (Allergy Relief) 10 MG tablet Take 1 tablet by mouth Daily.   • [DISCONTINUED] omeprazole (priLOSEC) 20 MG capsule Take 1 capsule by mouth Daily.   • [DISCONTINUED] pantoprazole (PROTONIX) 40 MG EC tablet Take 1 tablet by mouth Daily As Needed (heartburn/acid reflux symptoms).     No current facility-administered medications on file prior to visit.         There is no immunization history on file for this patient.    Review of Systems     Objective     /82 (BP Location: Left arm, Patient Position: Sitting, Cuff Size: Adult)   Pulse 90   Temp 96.7 °F (35.9 °C) (Temporal)   Ht 180.3 cm (71\")   Wt 128 kg (283 lb)   SpO2 98%   BMI 39.47 kg/m²       Physical Exam  Vitals reviewed.   Constitutional:       General: He is not in acute distress.     Appearance: He is well-developed. He is obese.   HENT:      Head: Normocephalic and atraumatic.   Eyes:      General: No scleral icterus.        Right eye: No discharge.         Left eye: No discharge.      Extraocular Movements: Extraocular movements intact.      Conjunctiva/sclera: Conjunctivae normal.   Neck:      Thyroid: No thyroid mass, thyromegaly or thyroid tenderness.      Vascular: No carotid bruit.      Trachea: Trachea normal.   Cardiovascular:      Rate and Rhythm: Normal rate and regular rhythm.      Pulses: Normal pulses.      Heart sounds: No murmur heard.  Pulmonary:      Effort: Pulmonary " effort is normal. No respiratory distress.      Breath sounds: Normal breath sounds. No wheezing, rhonchi or rales.   Musculoskeletal:         General: Normal range of motion.      Cervical back: Normal range of motion and neck supple. No tenderness.      Right lower leg: No edema.      Left lower leg: No edema.   Lymphadenopathy:      Cervical: No cervical adenopathy.   Skin:     General: Skin is warm and dry.   Neurological:      Mental Status: He is alert and oriented to person, place, and time.   Psychiatric:         Mood and Affect: Mood and affect normal.         Behavior: Behavior normal.         Thought Content: Thought content normal.         Judgment: Judgment normal.         Result Review :     The following data was reviewed by: GABI Echeverria on 03/17/2023:    CMP    CMP 9/20/22 10/27/22   Glucose 94 91   BUN 16 16   Creatinine 0.96 0.92   eGFR 89.4 94.1   Sodium 138 136   Potassium 4.3 4.0   Chloride 102 100   Calcium 9.8 9.9   Total Protein 6.9 7.4   Albumin 4.20 4.30   Globulin 2.7 3.1   Total Bilirubin 0.5 0.3   Alkaline Phosphatase 107 111   AST (SGOT) 17 20   ALT (SGPT) 16 18   Albumin/Globulin Ratio 1.6 1.4   BUN/Creatinine Ratio 16.7 17.4   Anion Gap 10.0 11.0      Comments are available for some flowsheets but are not being displayed.           CBC    CBC 9/20/22 10/27/22   WBC 8.56 9.23   RBC 4.67 4.75   Hemoglobin 14.1 14.5   Hematocrit 42.5 44.1   MCV 91.0 92.8   MCH 30.2 30.5   MCHC 33.2 32.9   RDW 12.9 12.5   Platelets 288 310           Lipid Panel    Lipid Panel 9/20/22   Total Cholesterol 223 (A)   Triglycerides 164 (A)   HDL Cholesterol 61 (A)   VLDL Cholesterol 29   LDL Cholesterol  133 (A)   LDL/HDL Ratio 2.12   (A) Abnormal value            Data reviewed: Cardiology studies : Holter and echo and GI studies EGD with pathology   UPPER GI ENDOSCOPY (11/16/2022 09:09)  Tissue Pathology Exam (11/16/2022 09:26)  H. Pylori Antigen, Stool - Stool, Per Rectum (11/01/2022  13:23)  Adult Transthoracic Echo Complete W/ Cont if Necessary Per Protocol (12/15/2022 08:59)  Holter Monitor - 48 Hour (12/15/2022 09:01)           Assessment and Plan      Diagnoses and all orders for this visit:    1. Essential hypertension (Primary)  -     lisinopril (PRINIVIL,ZESTRIL) 10 MG tablet; TAKE 1 TABLET BY MOUTH DAILY AS NEEDED FOR BLOOD PRESSURE >135/85  Dispense: 90 tablet; Refill: 1  -     CBC Auto Differential  -     Comprehensive Metabolic Panel  -     Lipid Panel  -     TSH+Free T4  -     Microalbumin / Creatinine Urine Ratio - Urine, Clean Catch    2. Hyperlipidemia, unspecified hyperlipidemia type  -     ezetimibe (Zetia) 10 MG tablet; Take 1 tablet by mouth Daily.  Dispense: 90 tablet; Refill: 1  -     Comprehensive Metabolic Panel  -     Lipid Panel    3. Gastroesophageal reflux disease without esophagitis  -     omeprazole (priLOSEC) 40 MG capsule; Take 1 capsule by mouth Daily.  Dispense: 90 capsule; Refill: 1    4. Chronic primary gout  -     allopurinol (ZYLOPRIM) 100 MG tablet; Take 1 tablet by mouth Daily.  Dispense: 90 tablet; Refill: 1  -     Uric Acid    5. Chronic sinus complaints  -     loratadine (Allergy Relief) 10 MG tablet; Take 1 tablet by mouth Daily.  Dispense: 90 tablet; Refill: 1    6. Chronic pain of right knee  -     ibuprofen (ADVIL,MOTRIN) 800 MG tablet; Take 1 tablet by mouth Every 8 (Eight) Hours As Needed for Mild Pain.  Dispense: 270 tablet; Refill: 0    7. Prostate cancer screening  -     PSA Screen            Follow Up     Return in about 6 months (around 9/17/2023) for Next scheduled follow up, medication refills and fasting labs.     At this time he does not want any medication for stress prescribed.  He feels that things will continue to improve, but if not, then he will reach back out to me.    In regards to his knee and ankle, he does not wish to have any evaluation at this time.  States when things seem to subside/calm down he will return for that.  States  may be 3 months.    Blood pressure at home has been well controlled.  He endorses whitecoat syndrome.  He will continue to monitor and notify me if any changes.    Patient was given instructions and counseling regarding his condition or for health maintenance advice. Please see specific information pulled into the AVS if appropriate.

## 2023-09-15 ENCOUNTER — OFFICE VISIT (OUTPATIENT)
Dept: FAMILY MEDICINE CLINIC | Facility: CLINIC | Age: 63
End: 2023-09-15
Payer: COMMERCIAL

## 2023-09-15 VITALS
OXYGEN SATURATION: 98 % | WEIGHT: 290 LBS | BODY MASS INDEX: 40.45 KG/M2 | SYSTOLIC BLOOD PRESSURE: 146 MMHG | DIASTOLIC BLOOD PRESSURE: 92 MMHG | HEART RATE: 83 BPM

## 2023-09-15 DIAGNOSIS — R09.89 CHRONIC SINUS COMPLAINTS: ICD-10-CM

## 2023-09-15 DIAGNOSIS — I10 ESSENTIAL HYPERTENSION: Primary | ICD-10-CM

## 2023-09-15 DIAGNOSIS — E78.5 HYPERLIPIDEMIA, UNSPECIFIED HYPERLIPIDEMIA TYPE: ICD-10-CM

## 2023-09-15 DIAGNOSIS — M1A.00X0 CHRONIC PRIMARY GOUT: ICD-10-CM

## 2023-09-15 DIAGNOSIS — R79.89 ELEVATED FERRITIN: ICD-10-CM

## 2023-09-15 DIAGNOSIS — Z13.1 SCREENING FOR DIABETES MELLITUS: ICD-10-CM

## 2023-09-15 DIAGNOSIS — N52.9 ERECTILE DYSFUNCTION, UNSPECIFIED ERECTILE DYSFUNCTION TYPE: ICD-10-CM

## 2023-09-15 RX ORDER — EZETIMIBE 10 MG/1
10 TABLET ORAL DAILY
Qty: 90 TABLET | Refills: 1 | Status: SHIPPED | OUTPATIENT
Start: 2023-09-15 | End: 2023-09-15 | Stop reason: SDUPTHER

## 2023-09-15 RX ORDER — EZETIMIBE 10 MG/1
10 TABLET ORAL DAILY
Qty: 90 TABLET | Refills: 1 | Status: SHIPPED | OUTPATIENT
Start: 2023-09-15

## 2023-09-15 RX ORDER — ALLOPURINOL 100 MG/1
100 TABLET ORAL DAILY
Qty: 90 TABLET | Refills: 1 | Status: SHIPPED | OUTPATIENT
Start: 2023-09-15

## 2023-09-15 RX ORDER — LISINOPRIL 5 MG/1
TABLET ORAL
Qty: 180 TABLET | Refills: 0 | Status: SHIPPED | OUTPATIENT
Start: 2023-09-15

## 2023-09-15 RX ORDER — LORATADINE 10 MG/1
10 TABLET ORAL DAILY
Qty: 90 TABLET | Refills: 1 | Status: SHIPPED | OUTPATIENT
Start: 2023-09-15

## 2023-09-15 NOTE — PROGRESS NOTES
Chief Complaint  Hypertension and Hyperlipidemia    Subjective    Kip Oliveira presents to [facility]  History of Present Illness    Kip presents to the office today for follow up on chronic health conditions -     He stopped his omeprazole - started drinking ACV and that has helped, in addition to dietary changes.     Since seeing me last he states that he had an episode of HTN - blood pressure was 200 at home. He went to the ER and they had an elevated BP reading too - ER did a workup - he states that they ordered him a medication but never gave it to him - told him to follow up with PCP. He went home and that is when he talked to other people and the recommendation was made for ACV. Since taking that, heartburn is better and his blood pressure is better as well.     He had an EGD and he was found to have candida in the esophagus. He was also having some issues with ring worm on his leg. He states that the GI MD gave him some medication and that knocked it back but didn't resolve it - he started the ACV and coconut oil and the rash is gone, and he doesn't have reflux, or heart palpitations.     He is taking lisinopril - RX 10mg but only taking 5mg daily. He is not having any chest pain, palpitations, headaches, dizziness, or shortness of breath. No LE edema. Blood pressure at home is usually 120s over 60s when he 'remembers to take the blood pressure medication'. He does notice that during the when he is up and active it is higher, like here in the office, but if he is relaxed and and not doing anything it is normal. If he takes 10mg daily then he is dizzy. He states if he eats something which causes gas - then he might have palpitations. It usually occurs 6-8 hours later. He takes simethicone.     Holter Monitor - 48 Hour (12/15/2022 09:01)   Adult Transthoracic Echo Complete W/ Cont if Necessary Per Protocol (12/15/2022 08:59)   Office Visit with Clifford Riggs MD  (11/04/2022)     He had a follow-up scheduled with cardiology in January but canceled that.  He states that they already gave him results and then they wanted another appointment to give him the results again.  He did not feel that was necessary.  At this time he feels like he is doing well and does not need to return to cardiology.      Objective   Vital Signs:   Vitals:    09/15/23 1047   BP: 146/92   Pulse: 83   SpO2: 98%   Weight: 132 kg (290 lb)       Wt Readings from Last 3 Encounters:   09/15/23 132 kg (290 lb)   03/17/23 128 kg (283 lb)   12/15/22 128 kg (283 lb)     BP Readings from Last 3 Encounters:   09/15/23 146/92   03/17/23 148/82   12/15/22 134/89         Physical Exam  Vitals reviewed.   Constitutional:       General: He is not in acute distress.     Appearance: He is well-developed. He is obese.   HENT:      Head: Normocephalic and atraumatic.   Eyes:      General: No scleral icterus.        Right eye: No discharge.         Left eye: No discharge.      Extraocular Movements: Extraocular movements intact.      Conjunctiva/sclera: Conjunctivae normal.   Neck:      Vascular: No carotid bruit.      Trachea: Trachea normal.   Cardiovascular:      Rate and Rhythm: Normal rate and regular rhythm.      Pulses: Normal pulses.      Heart sounds: No murmur heard.  Pulmonary:      Effort: Pulmonary effort is normal. No respiratory distress.      Breath sounds: Normal breath sounds. No wheezing, rhonchi or rales.   Musculoskeletal:         General: Normal range of motion.      Cervical back: Normal range of motion and neck supple. No tenderness.      Right lower leg: No edema.      Left lower leg: No edema.   Lymphadenopathy:      Cervical: No cervical adenopathy.   Skin:     General: Skin is warm and dry.   Neurological:      Mental Status: He is alert and oriented to person, place, and time.   Psychiatric:         Mood and Affect: Mood and affect normal.         Behavior: Behavior normal.         Thought  Content: Thought content normal.         Judgment: Judgment normal.        Result Review :  The following data was reviewed by: GABI Echeverria on 09/15/2023:    No visits with results within 1 Month(s) from this visit.   Latest known visit with results is:   Office Visit on 03/17/2023   Component Date Value    WBC 03/17/2023 9.02     RBC 03/17/2023 5.11     Hemoglobin 03/17/2023 14.9     Hematocrit 03/17/2023 46.0     MCV 03/17/2023 90.0     MCH 03/17/2023 29.2     MCHC 03/17/2023 32.4     RDW 03/17/2023 12.2 (L)     RDW-SD 03/17/2023 40.5     MPV 03/17/2023 10.5     Platelets 03/17/2023 296     Neutrophil % 03/17/2023 55.4     Lymphocyte % 03/17/2023 33.3     Monocyte % 03/17/2023 7.4     Eosinophil % 03/17/2023 2.3     Basophil % 03/17/2023 1.3     Immature Grans % 03/17/2023 0.3     Neutrophils, Absolute 03/17/2023 4.99     Lymphocytes, Absolute 03/17/2023 3.00     Monocytes, Absolute 03/17/2023 0.67     Eosinophils, Absolute 03/17/2023 0.21     Basophils, Absolute 03/17/2023 0.12     Immature Grans, Absolute 03/17/2023 0.03     nRBC 03/17/2023 0.0     Glucose 03/17/2023 94     BUN 03/17/2023 18     Creatinine 03/17/2023 1.32 (H)     Sodium 03/17/2023 136     Potassium 03/17/2023 4.8     Chloride 03/17/2023 97 (L)     CO2 03/17/2023 25.6     Calcium 03/17/2023 10.4     Total Protein 03/17/2023 7.7     Albumin 03/17/2023 4.4     ALT (SGPT) 03/17/2023 24     AST (SGOT) 03/17/2023 25     Alkaline Phosphatase 03/17/2023 113     Total Bilirubin 03/17/2023 0.5     Globulin 03/17/2023 3.3     A/G Ratio 03/17/2023 1.3     BUN/Creatinine Ratio 03/17/2023 13.6     Anion Gap 03/17/2023 13.4     eGFR 03/17/2023 61.0     Total Cholesterol 03/17/2023 224 (H)     Triglycerides 03/17/2023 106     HDL Cholesterol 03/17/2023 60     LDL Cholesterol  03/17/2023 145 (H)     VLDL Cholesterol 03/17/2023 19     LDL/HDL Ratio 03/17/2023 2.38     TSH 03/17/2023 1.600     Free T4 03/17/2023 1.29     Microalbumin/Creatinine *  03/17/2023      Creatinine, Urine 03/17/2023 20.6     Microalbumin, Urine 03/17/2023 <1.2     PSA 03/17/2023 0.529     Uric Acid 03/17/2023 6.6      Ferritin (09/20/2022 08:41)   Hemochromatosis Mutation (10/27/2022 15:12)  H. Pylori Antigen, Stool - Stool, Per Rectum (11/01/2022 13:23)  Tissue Pathology Exam (11/16/2022 09:26)    Procedures        Assessment and Plan   Diagnoses and all orders for this visit:    1. Essential hypertension (Primary)  -     lisinopril (PRINIVIL,ZESTRIL) 5 MG tablet; TAKE 1-2 TABLETS BY MOUTH DAILY AS NEEDED FOR BLOOD PRESSURE >135/85  Dispense: 180 tablet; Refill: 0  -     CBC Auto Differential; Future  -     Comprehensive Metabolic Panel; Future  -     Lipid Panel; Future  -     TSH+Free T4; Future  -     Microalbumin / Creatinine Urine Ratio - Urine, Clean Catch; Future    2. Hyperlipidemia, unspecified hyperlipidemia type  -     Discontinue: ezetimibe (Zetia) 10 MG tablet; Take 1 tablet by mouth Daily.  Dispense: 90 tablet; Refill: 1  -     Comprehensive Metabolic Panel; Future  -     Lipid Panel; Future  -     ezetimibe (Zetia) 10 MG tablet; Take 1 tablet by mouth Daily.  Dispense: 90 tablet; Refill: 1    3. Chronic primary gout  -     allopurinol (ZYLOPRIM) 100 MG tablet; Take 1 tablet by mouth Daily.  Dispense: 90 tablet; Refill: 1  -     Uric Acid; Future    4. Chronic sinus complaints  -     loratadine (Allergy Relief) 10 MG tablet; Take 1 tablet by mouth Daily.  Dispense: 90 tablet; Refill: 1    5. Elevated ferritin  -     Ferritin; Future    6. Erectile dysfunction, unspecified erectile dysfunction type  -     Testosterone, Free, Total; Future    7. Screening for diabetes mellitus  -     Hemoglobin A1c; Future        Class 3 Severe Obesity (BMI >=40). Obesity-related health conditions include the following: hypertension, dyslipidemias, GERD, and osteoarthritis. Obesity is worsening. BMI is is above average; BMI management plan is completed. Recommendations: low calorie, low  carb based diet program, portion control, and increasing exercise.     FOLLOW UP  Return in about 6 months (around 3/15/2024) for Next scheduled follow up, medication refills and fasting labs.  He will return to the office for fasting labs.  We will plan on 6-month follow-up unless he needs to be seen sooner based on outcome of labs.      Patient was given instructions and counseling regarding his condition or for health maintenance advice. Please see specific information pulled into the AVS if appropriate.       Connie Castaneda, APRN  09/15/23  11:15 EDT    CURRENT & DISCONTINUED MEDICATIONS  Current Outpatient Medications   Medication Instructions    allopurinol (ZYLOPRIM) 100 mg, Oral, Daily    ezetimibe (ZETIA) 10 mg, Oral, Daily    ibuprofen (ADVIL,MOTRIN) 800 mg, Oral, Every 8 Hours PRN    lisinopril (PRINIVIL,ZESTRIL) 5 MG tablet TAKE 1-2 TABLETS BY MOUTH DAILY AS NEEDED FOR BLOOD PRESSURE >135/85    loratadine (ALLERGY RELIEF) 10 mg, Oral, Daily    sildenafil (REVATIO) 20 MG tablet Take one tablet by mouth PRN prior to sexual activity       Medications Discontinued During This Encounter   Medication Reason    omeprazole (priLOSEC) 40 MG capsule *Therapy completed    allopurinol (ZYLOPRIM) 100 MG tablet Reorder    loratadine (Allergy Relief) 10 MG tablet Reorder    lisinopril (PRINIVIL,ZESTRIL) 10 MG tablet Reorder    ezetimibe (Zetia) 10 MG tablet Reorder    ezetimibe (Zetia) 10 MG tablet Reorder

## 2023-09-25 ENCOUNTER — CLINICAL SUPPORT (OUTPATIENT)
Dept: FAMILY MEDICINE CLINIC | Facility: CLINIC | Age: 63
End: 2023-09-25

## 2023-09-25 DIAGNOSIS — R79.89 ELEVATED FERRITIN: ICD-10-CM

## 2023-09-25 DIAGNOSIS — M1A.00X0 CHRONIC PRIMARY GOUT: ICD-10-CM

## 2023-09-25 DIAGNOSIS — Z13.1 SCREENING FOR DIABETES MELLITUS: ICD-10-CM

## 2023-09-25 DIAGNOSIS — E78.5 HYPERLIPIDEMIA, UNSPECIFIED HYPERLIPIDEMIA TYPE: ICD-10-CM

## 2023-09-25 DIAGNOSIS — N52.9 ERECTILE DYSFUNCTION, UNSPECIFIED ERECTILE DYSFUNCTION TYPE: ICD-10-CM

## 2023-09-25 DIAGNOSIS — I10 ESSENTIAL HYPERTENSION: ICD-10-CM

## 2023-09-25 LAB
ALBUMIN SERPL-MCNC: 4.2 G/DL (ref 3.5–5.2)
ALBUMIN UR-MCNC: <1.2 MG/DL
ALBUMIN/GLOB SERPL: 1.4 G/DL
ALP SERPL-CCNC: 96 U/L (ref 39–117)
ALT SERPL W P-5'-P-CCNC: 17 U/L (ref 1–41)
ANION GAP SERPL CALCULATED.3IONS-SCNC: 10.2 MMOL/L (ref 5–15)
AST SERPL-CCNC: 16 U/L (ref 1–40)
BASOPHILS # BLD AUTO: 0.14 10*3/MM3 (ref 0–0.2)
BASOPHILS NFR BLD AUTO: 1.6 % (ref 0–1.5)
BILIRUB SERPL-MCNC: 0.4 MG/DL (ref 0–1.2)
BUN SERPL-MCNC: 13 MG/DL (ref 8–23)
BUN/CREAT SERPL: 12.3 (ref 7–25)
CALCIUM SPEC-SCNC: 9.8 MG/DL (ref 8.6–10.5)
CHLORIDE SERPL-SCNC: 100 MMOL/L (ref 98–107)
CHOLEST SERPL-MCNC: 197 MG/DL (ref 0–200)
CO2 SERPL-SCNC: 25.8 MMOL/L (ref 22–29)
CREAT SERPL-MCNC: 1.06 MG/DL (ref 0.76–1.27)
CREAT UR-MCNC: 62 MG/DL
DEPRECATED RDW RBC AUTO: 40 FL (ref 37–54)
EGFRCR SERPLBLD CKD-EPI 2021: 78.9 ML/MIN/1.73
EOSINOPHIL # BLD AUTO: 0.33 10*3/MM3 (ref 0–0.4)
EOSINOPHIL NFR BLD AUTO: 3.7 % (ref 0.3–6.2)
ERYTHROCYTE [DISTWIDTH] IN BLOOD BY AUTOMATED COUNT: 12.1 % (ref 12.3–15.4)
FERRITIN SERPL-MCNC: 431 NG/ML (ref 30–400)
GLOBULIN UR ELPH-MCNC: 2.9 GM/DL
GLUCOSE SERPL-MCNC: 93 MG/DL (ref 65–99)
HBA1C MFR BLD: 5.8 % (ref 4.8–5.6)
HCT VFR BLD AUTO: 42.4 % (ref 37.5–51)
HDLC SERPL-MCNC: 57 MG/DL (ref 40–60)
HGB BLD-MCNC: 14.3 G/DL (ref 13–17.7)
IMM GRANULOCYTES # BLD AUTO: 0.05 10*3/MM3 (ref 0–0.05)
IMM GRANULOCYTES NFR BLD AUTO: 0.6 % (ref 0–0.5)
LDLC SERPL CALC-MCNC: 119 MG/DL (ref 0–100)
LDLC/HDLC SERPL: 2.04 {RATIO}
LYMPHOCYTES # BLD AUTO: 2.99 10*3/MM3 (ref 0.7–3.1)
LYMPHOCYTES NFR BLD AUTO: 33.6 % (ref 19.6–45.3)
MCH RBC QN AUTO: 30.9 PG (ref 26.6–33)
MCHC RBC AUTO-ENTMCNC: 33.7 G/DL (ref 31.5–35.7)
MCV RBC AUTO: 91.6 FL (ref 79–97)
MICROALBUMIN/CREAT UR: NORMAL MG/G{CREAT}
MONOCYTES # BLD AUTO: 0.69 10*3/MM3 (ref 0.1–0.9)
MONOCYTES NFR BLD AUTO: 7.8 % (ref 5–12)
NEUTROPHILS NFR BLD AUTO: 4.69 10*3/MM3 (ref 1.7–7)
NEUTROPHILS NFR BLD AUTO: 52.7 % (ref 42.7–76)
NRBC BLD AUTO-RTO: 0 /100 WBC (ref 0–0.2)
PLATELET # BLD AUTO: 281 10*3/MM3 (ref 140–450)
PMV BLD AUTO: 9.9 FL (ref 6–12)
POTASSIUM SERPL-SCNC: 4.7 MMOL/L (ref 3.5–5.2)
PROT SERPL-MCNC: 7.1 G/DL (ref 6–8.5)
RBC # BLD AUTO: 4.63 10*6/MM3 (ref 4.14–5.8)
SODIUM SERPL-SCNC: 136 MMOL/L (ref 136–145)
T4 FREE SERPL-MCNC: 1.18 NG/DL (ref 0.93–1.7)
TRIGL SERPL-MCNC: 118 MG/DL (ref 0–150)
TSH SERPL DL<=0.05 MIU/L-ACNC: 1.75 UIU/ML (ref 0.27–4.2)
URATE SERPL-MCNC: 6.8 MG/DL (ref 3.4–7)
VLDLC SERPL-MCNC: 21 MG/DL (ref 5–40)
WBC NRBC COR # BLD: 8.89 10*3/MM3 (ref 3.4–10.8)

## 2023-09-25 PROCEDURE — 82043 UR ALBUMIN QUANTITATIVE: CPT | Performed by: NURSE PRACTITIONER

## 2023-09-25 PROCEDURE — 82570 ASSAY OF URINE CREATININE: CPT | Performed by: NURSE PRACTITIONER

## 2023-09-25 PROCEDURE — 82728 ASSAY OF FERRITIN: CPT | Performed by: NURSE PRACTITIONER

## 2023-09-25 PROCEDURE — 84402 ASSAY OF FREE TESTOSTERONE: CPT | Performed by: NURSE PRACTITIONER

## 2023-09-25 PROCEDURE — 80061 LIPID PANEL: CPT | Performed by: NURSE PRACTITIONER

## 2023-09-25 PROCEDURE — 84439 ASSAY OF FREE THYROXINE: CPT | Performed by: NURSE PRACTITIONER

## 2023-09-25 PROCEDURE — 36415 COLL VENOUS BLD VENIPUNCTURE: CPT | Performed by: NURSE PRACTITIONER

## 2023-09-25 PROCEDURE — 80050 GENERAL HEALTH PANEL: CPT | Performed by: NURSE PRACTITIONER

## 2023-09-25 PROCEDURE — 83036 HEMOGLOBIN GLYCOSYLATED A1C: CPT | Performed by: NURSE PRACTITIONER

## 2023-09-25 PROCEDURE — 84403 ASSAY OF TOTAL TESTOSTERONE: CPT | Performed by: NURSE PRACTITIONER

## 2023-09-25 PROCEDURE — 84550 ASSAY OF BLOOD/URIC ACID: CPT | Performed by: NURSE PRACTITIONER

## 2023-09-25 NOTE — PROGRESS NOTES
Venipuncture Blood Specimen Collection  Venipuncture performed in left arm  by Franchesca Jaime with good hemostasis. Patient tolerated the procedure well without complications.   09/25/23   Franchesca Jaime

## 2023-10-03 LAB
TESTOST FREE SERPL-MCNC: 6.1 PG/ML (ref 6.6–18.1)
TESTOST SERPL-MCNC: 558 NG/DL (ref 264–916)

## 2024-03-05 DIAGNOSIS — I10 ESSENTIAL HYPERTENSION: ICD-10-CM

## 2024-03-05 RX ORDER — LISINOPRIL 5 MG/1
TABLET ORAL
Qty: 60 TABLET | Refills: 0 | Status: SHIPPED | OUTPATIENT
Start: 2024-03-05

## 2024-03-22 ENCOUNTER — OFFICE VISIT (OUTPATIENT)
Dept: FAMILY MEDICINE CLINIC | Facility: CLINIC | Age: 64
End: 2024-03-22
Payer: COMMERCIAL

## 2024-03-22 VITALS
WEIGHT: 299 LBS | HEART RATE: 62 BPM | SYSTOLIC BLOOD PRESSURE: 134 MMHG | HEIGHT: 69 IN | DIASTOLIC BLOOD PRESSURE: 84 MMHG | OXYGEN SATURATION: 99 % | BODY MASS INDEX: 44.28 KG/M2

## 2024-03-22 DIAGNOSIS — I10 ESSENTIAL HYPERTENSION: Primary | ICD-10-CM

## 2024-03-22 DIAGNOSIS — R09.89 CHRONIC SINUS COMPLAINTS: ICD-10-CM

## 2024-03-22 DIAGNOSIS — N52.9 ERECTILE DYSFUNCTION, UNSPECIFIED ERECTILE DYSFUNCTION TYPE: ICD-10-CM

## 2024-03-22 DIAGNOSIS — M1A.00X0 CHRONIC PRIMARY GOUT: ICD-10-CM

## 2024-03-22 DIAGNOSIS — E78.5 HYPERLIPIDEMIA, UNSPECIFIED HYPERLIPIDEMIA TYPE: ICD-10-CM

## 2024-03-22 DIAGNOSIS — M25.561 CHRONIC PAIN OF RIGHT KNEE: ICD-10-CM

## 2024-03-22 DIAGNOSIS — R73.03 PRE-DIABETES: ICD-10-CM

## 2024-03-22 DIAGNOSIS — G89.29 CHRONIC PAIN OF RIGHT KNEE: ICD-10-CM

## 2024-03-22 DIAGNOSIS — Z12.5 PROSTATE CANCER SCREENING: ICD-10-CM

## 2024-03-22 RX ORDER — SILDENAFIL CITRATE 20 MG/1
TABLET ORAL
Qty: 40 TABLET | Refills: 2 | Status: SHIPPED | OUTPATIENT
Start: 2024-03-22

## 2024-03-22 RX ORDER — IBUPROFEN 800 MG/1
800 TABLET ORAL EVERY 8 HOURS PRN
Qty: 270 TABLET | Refills: 1 | Status: SHIPPED | OUTPATIENT
Start: 2024-03-22

## 2024-03-22 RX ORDER — FEXOFENADINE HCL 180 MG/1
180 TABLET ORAL DAILY
Qty: 90 TABLET | Refills: 1 | Status: SHIPPED | OUTPATIENT
Start: 2024-03-22

## 2024-03-22 RX ORDER — ALLOPURINOL 100 MG/1
100 TABLET ORAL DAILY
Qty: 90 TABLET | Refills: 1 | Status: SHIPPED | OUTPATIENT
Start: 2024-03-22

## 2024-03-22 RX ORDER — LISINOPRIL 5 MG/1
TABLET ORAL
Qty: 180 TABLET | Refills: 1 | Status: SHIPPED | OUTPATIENT
Start: 2024-03-22

## 2024-03-22 RX ORDER — EZETIMIBE 10 MG/1
10 TABLET ORAL DAILY
Qty: 90 TABLET | Refills: 1 | Status: SHIPPED | OUTPATIENT
Start: 2024-03-22

## 2024-03-22 RX ORDER — LORATADINE 10 MG/1
10 TABLET ORAL DAILY
Qty: 90 TABLET | Refills: 1 | Status: CANCELLED | OUTPATIENT
Start: 2024-03-22

## 2024-03-22 NOTE — PROGRESS NOTES
Chief Complaint  Hypertension and Med Refill (Refill ibuprofen )    History of Present Illness  Kip Oliveira is a 63 y.o. male who presents to [facility] with a past medical history of    Past Medical History:   Diagnosis Date    GERD (gastroesophageal reflux disease)     Hyperlipidemia     Palpitations      Mr. Oliveira presents to the office today for 6 month follow up - he is not fasting for labs today, but states he can RTC tomorrow or Monday for labs.     He continues to take IBU 800mg TID PRN - he did not get these refilled the last time he was here, so he is running low now - he would like those refilled today. Taking for chronic knee pain.  He denies any GI side effects with use. He remains off of PPI - he continues to use ACV and coconut oil for GERD treatment - no recurrence of yeast that he is aware of. No dysphagia, nausea, or vomiting. If he has reflux symptoms then he takes a shot of ACV with mother and that resolves it.  Denies melena.    He is taking allopurinol for gout prophylaxis - hx of gout flare approx. 40 years ago in his great toe, but also history of elevated uric acid otherwise. Maintaining with allopurinol.     Blood pressure is normotensive on exam today, 134/84.  He feels like it is related to use of apple cider vinegar.  He is continuing to take lisinopril 5 mg daily.  No complaints of chest pain, palpitations, headaches, dizziness, or shortness of breath.  No lower extremity edema.    He does take Zetia for dyslipidemia.  He was getting this from [facility] using [facility]; however, he recently talked to the pharmacy and his out-of-pocket expense is actually less if he does not run this through his insurance.  With insurance it is $40 a month, and without it is only $16.    He takes loratadine for perineal allergy symptoms - he really can't see that it does anything.  He has used Flonase, but that tends to give him a headache.  He has not tried other  "antihistamines such as Zyrtec, Allegra, or Xyzal.    Objective   Vital Signs:   Vitals:    03/22/24 1037   BP: 134/84   Pulse: 62   SpO2: 99%   Weight: 136 kg (299 lb)   Height: 174 cm (68.5\")     Body mass index is 44.8 kg/m².    Wt Readings from Last 3 Encounters:   03/22/24 136 kg (299 lb)   09/15/23 132 kg (290 lb)   03/17/23 128 kg (283 lb)     BP Readings from Last 3 Encounters:   03/22/24 134/84   09/15/23 146/92   03/17/23 148/82       Health Maintenance   Topic Date Due    ANNUAL PHYSICAL  03/01/2023    BMI FOLLOWUP  09/15/2024    LIPID PANEL  09/25/2024    COLORECTAL CANCER SCREENING  10/28/2024    TDAP/TD VACCINES (2 - Td or Tdap) 01/01/2028    HEPATITIS C SCREENING  Completed    Pneumococcal Vaccine 0-64  Aged Out    COVID-19 Vaccine  Discontinued    RSV Vaccine - Adults  Discontinued    INFLUENZA VACCINE  Discontinued    ZOSTER VACCINE  Discontinued     Physical Exam  Vitals reviewed.   Constitutional:       General: He is not in acute distress.     Appearance: He is well-developed. He is morbidly obese.   HENT:      Head: Normocephalic and atraumatic.   Eyes:      General: No scleral icterus.        Right eye: No discharge.         Left eye: No discharge.      Extraocular Movements: Extraocular movements intact.      Conjunctiva/sclera: Conjunctivae normal.   Neck:      Vascular: No carotid bruit.      Trachea: Trachea normal.   Cardiovascular:      Rate and Rhythm: Normal rate and regular rhythm.      Pulses: Normal pulses.   Pulmonary:      Effort: Pulmonary effort is normal.      Breath sounds: Normal breath sounds.   Musculoskeletal:         General: Normal range of motion.      Cervical back: Normal range of motion and neck supple. No tenderness.      Right lower leg: No edema.      Left lower leg: No edema.   Lymphadenopathy:      Cervical: No cervical adenopathy.   Skin:     General: Skin is warm and dry.   Neurological:      Mental Status: He is alert and oriented to person, place, and time. "   Psychiatric:         Mood and Affect: Mood and affect normal.         Behavior: Behavior normal.         Thought Content: Thought content normal.         Judgment: Judgment normal.       Result Review :  The following data was reviewed by: GABI Echeverria on 03/22/2024:    No visits with results within 1 Month(s) from this visit.   Latest known visit with results is:   Clinical Support on 09/25/2023   Component Date Value    WBC 09/25/2023 8.89     RBC 09/25/2023 4.63     Hemoglobin 09/25/2023 14.3     Hematocrit 09/25/2023 42.4     MCV 09/25/2023 91.6     MCH 09/25/2023 30.9     MCHC 09/25/2023 33.7     RDW 09/25/2023 12.1 (L)     RDW-SD 09/25/2023 40.0     MPV 09/25/2023 9.9     Platelets 09/25/2023 281     Neutrophil % 09/25/2023 52.7     Lymphocyte % 09/25/2023 33.6     Monocyte % 09/25/2023 7.8     Eosinophil % 09/25/2023 3.7     Basophil % 09/25/2023 1.6 (H)     Immature Grans % 09/25/2023 0.6 (H)     Neutrophils, Absolute 09/25/2023 4.69     Lymphocytes, Absolute 09/25/2023 2.99     Monocytes, Absolute 09/25/2023 0.69     Eosinophils, Absolute 09/25/2023 0.33     Basophils, Absolute 09/25/2023 0.14     Immature Grans, Absolute 09/25/2023 0.05     nRBC 09/25/2023 0.0     Glucose 09/25/2023 93     BUN 09/25/2023 13     Creatinine 09/25/2023 1.06     Sodium 09/25/2023 136     Potassium 09/25/2023 4.7     Chloride 09/25/2023 100     CO2 09/25/2023 25.8     Calcium 09/25/2023 9.8     Total Protein 09/25/2023 7.1     Albumin 09/25/2023 4.2     ALT (SGPT) 09/25/2023 17     AST (SGOT) 09/25/2023 16     Alkaline Phosphatase 09/25/2023 96     Total Bilirubin 09/25/2023 0.4     Globulin 09/25/2023 2.9     A/G Ratio 09/25/2023 1.4     BUN/Creatinine Ratio 09/25/2023 12.3     Anion Gap 09/25/2023 10.2     eGFR 09/25/2023 78.9     Total Cholesterol 09/25/2023 197     Triglycerides 09/25/2023 118     HDL Cholesterol 09/25/2023 57     LDL Cholesterol  09/25/2023 119 (H)     VLDL Cholesterol 09/25/2023 21      LDL/HDL Ratio 09/25/2023 2.04     TSH 09/25/2023 1.750     Free T4 09/25/2023 1.18     Microalbumin/Creatinine * 09/25/2023      Creatinine, Urine 09/25/2023 62.0     Microalbumin, Urine 09/25/2023 <1.2     Uric Acid 09/25/2023 6.8     Ferritin 09/25/2023 431.00 (H)     Testosterone, Total 09/25/2023 558     Testosterone, Free 09/25/2023 6.1 (L)     Hemoglobin A1C 09/25/2023 5.80 (H)      Cologuard - Stool, Per Rectum (10/28/2021)     UPPER GI ENDOSCOPY (11/16/2022 09:09)     Procedures        Assessment and Plan   Diagnoses and all orders for this visit:    1. Essential hypertension (Primary)  -     CBC Auto Differential; Future  -     Comprehensive Metabolic Panel; Future  -     Lipid Panel; Future  -     TSH+Free T4; Future  -     Microalbumin / Creatinine Urine Ratio - Urine, Clean Catch; Future  -     lisinopril (PRINIVIL,ZESTRIL) 5 MG tablet; TAKE 1-2 TABLETS BY MOUTH DAILY AS NEEDED FOR BLOOD PRESSURE >135/85  Dispense: 180 tablet; Refill: 1    2. Hyperlipidemia, unspecified hyperlipidemia type  -     Comprehensive Metabolic Panel; Future  -     Lipid Panel; Future  -     ezetimibe (Zetia) 10 MG tablet; Take 1 tablet by mouth Daily.  Dispense: 90 tablet; Refill: 1    3. Pre-diabetes  -     Hemoglobin A1c; Future    4. Chronic primary gout  -     allopurinol (ZYLOPRIM) 100 MG tablet; Take 1 tablet by mouth Daily.  Dispense: 90 tablet; Refill: 1    5. Chronic pain of right knee  -     ibuprofen (ADVIL,MOTRIN) 800 MG tablet; Take 1 tablet by mouth Every 8 (Eight) Hours As Needed for Mild Pain.  Dispense: 270 tablet; Refill: 1    6. Chronic sinus complaints  -     fexofenadine (Allegra Allergy) 180 MG tablet; Take 1 tablet by mouth Daily.  Dispense: 90 tablet; Refill: 1    7. Erectile dysfunction, unspecified erectile dysfunction type  -     sildenafil (REVATIO) 20 MG tablet; Take one tablet by mouth PRN prior to sexual activity  Dispense: 40 tablet; Refill: 2    8. Prostate cancer screening  -     PSA Screen;  Future                  FOLLOW UP  Return in about 6 months (around 9/22/2024) for Annual physical, medication refills and fasting labs.    He will return to the office for fasting labs tomorrow or Monday.  I will send him a message via [employer] with his results.  If not viewed within 72 hours then he will be mailed a letter with results.  Until I receive the labs the only medication change that I will make today will include discontinuing loratadine and initiating Allegra for chronic sinus complaints.  He will let me know if this is not efficacious and we can then consider Xyzal or Zyrtec.  Perhaps Nasacort may be less irritating than Flonase.  He will keep me updated regarding his progress.    Patient was given instructions and counseling regarding his condition or for health maintenance advice. Please see specific information pulled into the AVS if appropriate.       Connie Castaneda, APRN  03/22/24  10:55 EDT    CURRENT & DISCONTINUED MEDICATIONS  Current Outpatient Medications   Medication Instructions    allopurinol (ZYLOPRIM) 100 mg, Oral, Daily    ezetimibe (ZETIA) 10 mg, Oral, Daily    fexofenadine (ALLEGRA ALLERGY) 180 mg, Oral, Daily    ibuprofen (ADVIL,MOTRIN) 800 mg, Oral, Every 8 Hours PRN    lisinopril (PRINIVIL,ZESTRIL) 5 MG tablet TAKE 1-2 TABLETS BY MOUTH DAILY AS NEEDED FOR BLOOD PRESSURE >135/85    sildenafil (REVATIO) 20 MG tablet Take one tablet by mouth PRN prior to sexual activity       Medications Discontinued During This Encounter   Medication Reason    loratadine (Allergy Relief) 10 MG tablet Not Efficacious    sildenafil (REVATIO) 20 MG tablet Reorder    ibuprofen (ADVIL,MOTRIN) 800 MG tablet Reorder    allopurinol (ZYLOPRIM) 100 MG tablet Reorder    ezetimibe (Zetia) 10 MG tablet Reorder    lisinopril (PRINIVIL,ZESTRIL) 5 MG tablet Reorder

## 2024-03-28 ENCOUNTER — CLINICAL SUPPORT (OUTPATIENT)
Dept: FAMILY MEDICINE CLINIC | Facility: CLINIC | Age: 64
End: 2024-03-28
Payer: COMMERCIAL

## 2024-03-28 DIAGNOSIS — E78.5 HYPERLIPIDEMIA, UNSPECIFIED HYPERLIPIDEMIA TYPE: ICD-10-CM

## 2024-03-28 DIAGNOSIS — R73.03 PRE-DIABETES: ICD-10-CM

## 2024-03-28 DIAGNOSIS — Z12.5 PROSTATE CANCER SCREENING: ICD-10-CM

## 2024-03-28 DIAGNOSIS — I10 ESSENTIAL HYPERTENSION: ICD-10-CM

## 2024-03-28 LAB
ALBUMIN SERPL-MCNC: 4.2 G/DL (ref 3.5–5.2)
ALBUMIN UR-MCNC: <1.2 MG/DL
ALBUMIN/GLOB SERPL: 1.5 G/DL
ALP SERPL-CCNC: 100 U/L (ref 39–117)
ALT SERPL W P-5'-P-CCNC: 17 U/L (ref 1–41)
ANION GAP SERPL CALCULATED.3IONS-SCNC: 10.2 MMOL/L (ref 5–15)
AST SERPL-CCNC: 17 U/L (ref 1–40)
BASOPHILS # BLD AUTO: 0.16 10*3/MM3 (ref 0–0.2)
BASOPHILS NFR BLD AUTO: 2.1 % (ref 0–1.5)
BILIRUB SERPL-MCNC: 0.5 MG/DL (ref 0–1.2)
BUN SERPL-MCNC: 19 MG/DL (ref 8–23)
BUN/CREAT SERPL: 16.8 (ref 7–25)
CALCIUM SPEC-SCNC: 9.6 MG/DL (ref 8.6–10.5)
CHLORIDE SERPL-SCNC: 101 MMOL/L (ref 98–107)
CHOLEST SERPL-MCNC: 206 MG/DL (ref 0–200)
CO2 SERPL-SCNC: 26.8 MMOL/L (ref 22–29)
CREAT SERPL-MCNC: 1.13 MG/DL (ref 0.76–1.27)
CREAT UR-MCNC: 91.4 MG/DL
DEPRECATED RDW RBC AUTO: 42.2 FL (ref 37–54)
EGFRCR SERPLBLD CKD-EPI 2021: 73 ML/MIN/1.73
EOSINOPHIL # BLD AUTO: 0.37 10*3/MM3 (ref 0–0.4)
EOSINOPHIL NFR BLD AUTO: 4.7 % (ref 0.3–6.2)
ERYTHROCYTE [DISTWIDTH] IN BLOOD BY AUTOMATED COUNT: 12.7 % (ref 12.3–15.4)
GLOBULIN UR ELPH-MCNC: 2.8 GM/DL
GLUCOSE SERPL-MCNC: 97 MG/DL (ref 65–99)
HBA1C MFR BLD: 5.6 % (ref 4.8–5.6)
HCT VFR BLD AUTO: 44.1 % (ref 37.5–51)
HDLC SERPL-MCNC: 62 MG/DL (ref 40–60)
HGB BLD-MCNC: 14.8 G/DL (ref 13–17.7)
IMM GRANULOCYTES # BLD AUTO: 0.02 10*3/MM3 (ref 0–0.05)
IMM GRANULOCYTES NFR BLD AUTO: 0.3 % (ref 0–0.5)
LDLC SERPL CALC-MCNC: 125 MG/DL (ref 0–100)
LDLC/HDLC SERPL: 1.97 {RATIO}
LYMPHOCYTES # BLD AUTO: 2.6 10*3/MM3 (ref 0.7–3.1)
LYMPHOCYTES NFR BLD AUTO: 33.3 % (ref 19.6–45.3)
MCH RBC QN AUTO: 30.3 PG (ref 26.6–33)
MCHC RBC AUTO-ENTMCNC: 33.6 G/DL (ref 31.5–35.7)
MCV RBC AUTO: 90.2 FL (ref 79–97)
MICROALBUMIN/CREAT UR: NORMAL MG/G{CREAT}
MONOCYTES # BLD AUTO: 0.63 10*3/MM3 (ref 0.1–0.9)
MONOCYTES NFR BLD AUTO: 8.1 % (ref 5–12)
NEUTROPHILS NFR BLD AUTO: 4.02 10*3/MM3 (ref 1.7–7)
NEUTROPHILS NFR BLD AUTO: 51.5 % (ref 42.7–76)
NRBC BLD AUTO-RTO: 0 /100 WBC (ref 0–0.2)
PLATELET # BLD AUTO: 317 10*3/MM3 (ref 140–450)
PMV BLD AUTO: 9.7 FL (ref 6–12)
POTASSIUM SERPL-SCNC: 4.4 MMOL/L (ref 3.5–5.2)
PROT SERPL-MCNC: 7 G/DL (ref 6–8.5)
PSA SERPL-MCNC: 0.51 NG/ML (ref 0–4)
RBC # BLD AUTO: 4.89 10*6/MM3 (ref 4.14–5.8)
SODIUM SERPL-SCNC: 138 MMOL/L (ref 136–145)
T4 FREE SERPL-MCNC: 1.17 NG/DL (ref 0.93–1.7)
TRIGL SERPL-MCNC: 109 MG/DL (ref 0–150)
TSH SERPL DL<=0.05 MIU/L-ACNC: 2.5 UIU/ML (ref 0.27–4.2)
VLDLC SERPL-MCNC: 19 MG/DL (ref 5–40)
WBC NRBC COR # BLD AUTO: 7.8 10*3/MM3 (ref 3.4–10.8)

## 2024-03-28 PROCEDURE — 80061 LIPID PANEL: CPT | Performed by: NURSE PRACTITIONER

## 2024-03-28 PROCEDURE — G0103 PSA SCREENING: HCPCS | Performed by: NURSE PRACTITIONER

## 2024-03-28 PROCEDURE — 83036 HEMOGLOBIN GLYCOSYLATED A1C: CPT | Performed by: NURSE PRACTITIONER

## 2024-03-28 PROCEDURE — 82043 UR ALBUMIN QUANTITATIVE: CPT | Performed by: NURSE PRACTITIONER

## 2024-03-28 PROCEDURE — 82570 ASSAY OF URINE CREATININE: CPT | Performed by: NURSE PRACTITIONER

## 2024-03-28 PROCEDURE — 84439 ASSAY OF FREE THYROXINE: CPT | Performed by: NURSE PRACTITIONER

## 2024-03-28 PROCEDURE — 36415 COLL VENOUS BLD VENIPUNCTURE: CPT | Performed by: NURSE PRACTITIONER

## 2024-03-28 PROCEDURE — 80050 GENERAL HEALTH PANEL: CPT | Performed by: NURSE PRACTITIONER

## 2024-03-28 NOTE — PROGRESS NOTES
..  Venipuncture Blood Specimen Collection  Venipuncture performed in LT arm by Franchesca Jaime with good hemostasis. Patient tolerated the procedure well without complications.   03/28/24   Cailin Singh MA

## 2024-05-26 DIAGNOSIS — R09.89 CHRONIC SINUS COMPLAINTS: ICD-10-CM

## 2024-05-28 RX ORDER — LORATADINE 10 MG/1
10 TABLET ORAL DAILY
Qty: 90 TABLET | Refills: 1 | OUTPATIENT
Start: 2024-05-28

## 2024-07-02 ENCOUNTER — OFFICE VISIT (OUTPATIENT)
Dept: FAMILY MEDICINE CLINIC | Facility: CLINIC | Age: 64
End: 2024-07-02
Payer: COMMERCIAL

## 2024-07-02 VITALS
BODY MASS INDEX: 42.51 KG/M2 | HEIGHT: 69 IN | HEART RATE: 69 BPM | OXYGEN SATURATION: 97 % | SYSTOLIC BLOOD PRESSURE: 130 MMHG | WEIGHT: 287 LBS | TEMPERATURE: 97.5 F | DIASTOLIC BLOOD PRESSURE: 72 MMHG

## 2024-07-02 DIAGNOSIS — Z86.19 HISTORY OF LYME DISEASE: ICD-10-CM

## 2024-07-02 DIAGNOSIS — L29.9 ITCHING: ICD-10-CM
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
## 2024-07-02 DIAGNOSIS — R20.2 PARESTHESIAS: ICD-10-CM

## 2024-07-02 DIAGNOSIS — S90.861A TICK BITE OF RIGHT FOOT, INITIAL ENCOUNTER: Primary | ICD-10-CM

## 2024-07-02 PROCEDURE — 86666 EHRLICHIA ANTIBODY: CPT | Performed by: NURSE PRACTITIONER

## 2024-07-02 PROCEDURE — 86618 LYME DISEASE ANTIBODY: CPT | Performed by: NURSE PRACTITIONER

## 2024-07-02 PROCEDURE — 86003 ALLG SPEC IGE CRUDE XTRC EA: CPT | Performed by: NURSE PRACTITIONER

## 2024-07-02 PROCEDURE — 86008 ALLG SPEC IGE RECOMB EA: CPT | Performed by: NURSE PRACTITIONER

## 2024-07-02 PROCEDURE — 99214 OFFICE O/P EST MOD 30 MIN: CPT | Performed by: NURSE PRACTITIONER

## 2024-07-02 PROCEDURE — 86757 RICKETTSIA ANTIBODY: CPT | Performed by: NURSE PRACTITIONER

## 2024-07-02 PROCEDURE — 82785 ASSAY OF IGE: CPT | Performed by: NURSE PRACTITIONER

## 2024-07-02 RX ORDER — DOXYCYCLINE HYCLATE 100 MG/1
100 CAPSULE ORAL 2 TIMES DAILY
Qty: 28 CAPSULE | Refills: 0 | Status: SHIPPED | OUTPATIENT
Start: 2024-07-02 | End: 2024-07-16

## 2024-07-02 RX ORDER — METHYLPREDNISOLONE 4 MG/1
TABLET ORAL
Qty: 21 EACH | Refills: 0 | Status: SHIPPED | OUTPATIENT
Start: 2024-07-02

## 2024-07-02 NOTE — PROGRESS NOTES
Chief Complaint  Tick Removal (Not tick removal, pt has been bit several times and has had lyme disease before. ) and Recurrent Skin Infections (Ringworm in the past.)    Subjective            Kip Oliveira presents to [facility]  History of Present Illness  1) patient has significant history of Lyme disease in the past and he has recently suffered with multiple tick bites and he is concerned and would like labs today--s/s he is experiencing is itching of the hand (palms) and then the FA and then sometimes leg or under the arm --pt reports this last tick bite was webbing of the toes right foot between the great toe and the 2nd toe    2) also here today with the mention of recurrent skin infections with a history of recurrent ringworm infections---approx 3 yrs ago--was here for the rash and then was located on the side of the leg and reports using multiple oral medications and interventions and finally reports that they then used the lotrimin ultra and cortisone topical both OTC and resolved it and then gone for 3 yrs and then started using the same OTC meds and the redness is gone and then the rough texture is still there    Then Nov 2021 and they did an EGD and found candida --21 days of diflucan     Also some Hx of the palpitations--and in the past and the did the cardiac workup and the gastro workup--and then apple cider           PHQ-2 Total Score: 0  PHQ-9 Total Score: 0    Past Medical History:   Diagnosis Date    GERD (gastroesophageal reflux disease)     Hyperlipidemia     Palpitations        Allergies   Allergen Reactions    Sulfa Antibiotics Anaphylaxis     High fevers and heart issues         Beta Adrenergic Blockers Mental Status Change     Significant bradycardia, somnolence, and chest pain    Naproxen Sodium Itching    Keflex [Cephalexin] Itching        Past Surgical History:   Procedure Laterality Date    COLONOSCOPY      ENDOSCOPY N/A 11/16/2022    Procedure:  ESOPHAGOGASTRODUODENOSCOPY;  Surgeon: Carlos Alberto Cordoba MD;  [place] [facility] [place]  Service: Gastroenterology;  Laterality: N/A;  small HH granular mucosa     UPPER GASTROINTESTINAL ENDOSCOPY      VENOUS ABLATION          Social History     Tobacco Use    Smoking status: Never    Smokeless tobacco: Never   Vaping Use    Vaping status: Never Used   Substance Use Topics    Alcohol use: No    Drug use: No       Family History   Problem Relation Age of Onset    Cancer Mother     Cancer Father     Colon cancer Neg Hx     Colon polyps Neg Hx     Crohn's disease Neg Hx     Irritable bowel syndrome Neg Hx     Ulcerative colitis Neg Hx         Health Maintenance Due   Topic Date Due    ANNUAL PHYSICAL  03/01/2023    COLORECTAL CANCER SCREENING  10/28/2024        Current Outpatient Medications on File Prior to Visit   Medication Sig    allopurinol (ZYLOPRIM) 100 MG tablet Take 1 tablet by mouth Daily.    ezetimibe (Zetia) 10 MG tablet Take 1 tablet by mouth Daily.    fexofenadine (Allegra Allergy) 180 MG tablet Take 1 tablet by mouth Daily.    ibuprofen (ADVIL,MOTRIN) 800 MG tablet Take 1 tablet by mouth Every 8 (Eight) Hours As Needed for Mild Pain.    lisinopril (PRINIVIL,ZESTRIL) 5 MG tablet TAKE 1-2 TABLETS BY MOUTH DAILY AS NEEDED FOR BLOOD PRESSURE >135/85    sildenafil (REVATIO) 20 MG tablet Take one tablet by mouth PRN prior to sexual activity     No current facility-administered medications on file prior to visit.       Immunization History   Administered Date(s) Administered    Tdap 01/01/2018       Review of Systems   Constitutional:  Negative for chills, fatigue and fever.        Some fatigue last week    Respiratory:  Negative for shortness of breath.    Cardiovascular:  Negative for chest pain and palpitations.   Gastrointestinal:  Negative for abdominal pain, nausea and vomiting.   Musculoskeletal:         Mild numbness of the area of the most recent tick bite  area the webbing of the toes    Skin:   "Positive for rash.        And itching    Neurological:  Negative for dizziness, syncope and light-headedness.   Hematological:  Does not bruise/bleed easily.        Objective     /72   Pulse 69   Temp 97.5 °F (36.4 °C) (Temporal)   Ht 174 cm (68.5\")   Wt 130 kg (287 lb)   SpO2 97%   BMI 43.00 kg/m²       Physical Exam  Vitals and nursing note reviewed.   Constitutional:       Appearance: Normal appearance.   HENT:      Head: Normocephalic.      Right Ear: External ear normal.      Left Ear: External ear normal.      Nose: Nose normal.      Mouth/Throat:      Mouth: Mucous membranes are moist.   Eyes:      Pupils: Pupils are equal, round, and reactive to light.   Cardiovascular:      Rate and Rhythm: Normal rate and regular rhythm.      Heart sounds: Normal heart sounds.   Pulmonary:      Effort: Pulmonary effort is normal.      Breath sounds: Normal breath sounds.   Abdominal:      Palpations: Abdomen is soft.   Musculoskeletal:         General: Normal range of motion.      Cervical back: Normal range of motion.   Skin:     General: Skin is warm and dry.      Comments: In the side of the right inner aspect great toe decreased sensation right where the tick bite is located--and can see where the tick was removed and no apparent retained parts    Neurological:      Mental Status: He is alert and oriented to person, place, and time.   Psychiatric:         Mood and Affect: Mood normal.         Behavior: Behavior normal.         Thought Content: Thought content normal.         Judgment: Judgment normal.         Result Review :                      PSA Screen (03/28/2024 08:03)  Microalbumin / Creatinine Urine Ratio - Urine, Clean Catch (03/28/2024 08:03)  TSH+Free T4 (03/28/2024 08:03)  Lipid Panel (03/28/2024 08:03)  Hemoglobin A1c (03/28/2024 08:03)  Comprehensive Metabolic Panel (03/28/2024 08:03)  CBC Auto Differential (03/28/2024 08:03)     Assessment and Plan      Diagnoses and all orders for this " visit:    1. Tick bite of right foot, initial encounter (Primary)  -     Ehrlichia Antibody Panel  -     Spotted Fever Group AB, IgG/IgM  -     Lyme Disease Total Antibody With Reflex to Immunoassay  -     Alpha-Gal IgE Panel  -     doxycycline (VIBRAMYCIN) 100 MG capsule; Take 1 capsule by mouth 2 (Two) Times a Day for 14 days.  Dispense: 28 capsule; Refill: 0    2. History of Lyme disease  -     Ehrlichia Antibody Panel  -     Spotted Fever Group AB, IgG/IgM  -     Lyme Disease Total Antibody With Reflex to Immunoassay  -     Alpha-Gal IgE Panel  -     doxycycline (VIBRAMYCIN) 100 MG capsule; Take 1 capsule by mouth 2 (Two) Times a Day for 14 days.  Dispense: 28 capsule; Refill: 0    3. Itching  -     Ehrlichia Antibody Panel  -     Spotted Fever Group AB, IgG/IgM  -     Lyme Disease Total Antibody With Reflex to Immunoassay  -     Alpha-Gal IgE Panel  -     methylPREDNISolone (MEDROL) 4 MG dose pack; Take as directed on package instructions.  Dispense: 21 each; Refill: 0  -     doxycycline (VIBRAMYCIN) 100 MG capsule; Take 1 capsule by mouth 2 (Two) Times a Day for 14 days.  Dispense: 28 capsule; Refill: 0    4. Paresthesias  -     Ehrlichia Antibody Panel  -     Spotted Fever Group AB, IgG/IgM  -     Lyme Disease Total Antibody With Reflex to Immunoassay  -     Alpha-Gal IgE Panel  -     doxycycline (VIBRAMYCIN) 100 MG capsule; Take 1 capsule by mouth 2 (Two) Times a Day for 14 days.  Dispense: 28 capsule; Refill: 0    Since the patient had a significant history of Lyme disease in the past we will start empiric treatment related to the recent tick bites with doxycycline 100 mg twice daily x 2 weeks and if everything is negative and nothing is current or new then we will advise patient to stop the antibiotics    And then regarding the itching we will go ahead and do a steroid pack    Also obtaining all of the labs as noted above        Follow Up     Return if symptoms worsen or fail to improve.    Patient was  given instructions and counseling regarding his condition or for health maintenance advice. Please see specific information pulled into the AVS if appropriate.            Kip Oliveira  reports that he has never smoked. He has never used smokeless tobacco. I have educated him on the risk of diseases from using tobacco products such as cancer, COPD, and heart disease.

## 2024-07-02 NOTE — PROGRESS NOTES
Venipuncture Blood Specimen Collection  Venipuncture performed in left arm by Franchesca Jaime with good hemostasis. Patient tolerated the procedure well without complications.   07/02/24   Rachel Samayoa

## 2024-07-03 LAB — B BURGDOR IGG+IGM SER QL IA: NEGATIVE

## 2024-07-03 NOTE — PROGRESS NOTES
Please mail letter to patient stating    Mr. Oliveira thus far the Lyme disease antibody panel was completely negative we are still awaiting the 2 other tickborne disease panels and the alpha-gal panel

## 2024-07-07 LAB
ALPHA-GAL IGE QN: 1.67 KU/L
BEEF IGE QN: 0.61 KU/L
CONV CLASS DESCRIPTION: ABNORMAL
IGE SERPL-ACNC: 78 IU/ML (ref 6–495)
LAMB IGE QN: 0.43 KU/L
PORK IGE QN: 0.41 KU/L

## 2024-07-08 NOTE — PROGRESS NOTES
Please mail letter to patient stating    Mr. Oliveira the alpha gal panel was positive and shows reactivity to beef pork and lamb and what you normally do in this instance is avoid these 3 types of meat and then whenever you follow-up with Roxann you will can discuss this further as to when it needs to be rechecked

## 2024-07-09 LAB
A PHAGOCYTOPH IGG TITR SER IF: NEGATIVE {TITER}
A PHAGOCYTOPH IGM TITR SER IF: NEGATIVE {TITER}
E CHAFFEENSIS IGG TITR SER IF: NEGATIVE {TITER}
E CHAFFEENSIS IGM TITR SER IF: NEGATIVE {TITER}
RESULT COMMENT:: NORMAL

## 2024-07-13 LAB
RESULT COMMENT:: NORMAL
RICK SF IGG TITR SER: NORMAL {TITER}
RICK SF IGM TITR SER: NORMAL {TITER}

## 2024-09-19 ENCOUNTER — OFFICE VISIT (OUTPATIENT)
Dept: FAMILY MEDICINE CLINIC | Facility: CLINIC | Age: 64
End: 2024-09-19
Payer: COMMERCIAL

## 2024-09-19 VITALS
SYSTOLIC BLOOD PRESSURE: 142 MMHG | OXYGEN SATURATION: 97 % | HEIGHT: 69 IN | WEIGHT: 293 LBS | BODY MASS INDEX: 43.4 KG/M2 | HEART RATE: 59 BPM | DIASTOLIC BLOOD PRESSURE: 80 MMHG | TEMPERATURE: 98.2 F

## 2024-09-19 DIAGNOSIS — Z00.00 ENCOUNTER FOR ANNUAL PHYSICAL EXAM: Primary | ICD-10-CM

## 2024-09-19 DIAGNOSIS — R09.89 CHRONIC SINUS COMPLAINTS: ICD-10-CM

## 2024-09-19 DIAGNOSIS — K44.9 HIATAL HERNIA: ICD-10-CM

## 2024-09-19 DIAGNOSIS — Z12.11 COLON CANCER SCREENING: ICD-10-CM

## 2024-09-19 DIAGNOSIS — Z91.018 ALLERGY TO ALPHA-GAL: ICD-10-CM

## 2024-09-19 DIAGNOSIS — M1A.00X0 CHRONIC PRIMARY GOUT: ICD-10-CM

## 2024-09-19 DIAGNOSIS — N52.9 ERECTILE DYSFUNCTION, UNSPECIFIED ERECTILE DYSFUNCTION TYPE: ICD-10-CM

## 2024-09-19 DIAGNOSIS — G89.29 CHRONIC PAIN OF RIGHT KNEE: ICD-10-CM

## 2024-09-19 DIAGNOSIS — R73.03 PRE-DIABETES: ICD-10-CM

## 2024-09-19 DIAGNOSIS — M25.561 CHRONIC PAIN OF RIGHT KNEE: ICD-10-CM

## 2024-09-19 DIAGNOSIS — K21.9 GASTROESOPHAGEAL REFLUX DISEASE WITHOUT ESOPHAGITIS: ICD-10-CM

## 2024-09-19 DIAGNOSIS — I10 ESSENTIAL HYPERTENSION: ICD-10-CM

## 2024-09-19 DIAGNOSIS — E78.5 HYPERLIPIDEMIA, UNSPECIFIED HYPERLIPIDEMIA TYPE: ICD-10-CM

## 2024-09-19 PROCEDURE — 99214 OFFICE O/P EST MOD 30 MIN: CPT | Performed by: NURSE PRACTITIONER

## 2024-09-19 PROCEDURE — 99396 PREV VISIT EST AGE 40-64: CPT | Performed by: NURSE PRACTITIONER

## 2024-09-19 RX ORDER — LISINOPRIL 5 MG/1
TABLET ORAL
Qty: 180 TABLET | Refills: 1 | Status: SHIPPED | OUTPATIENT
Start: 2024-09-19

## 2024-09-19 RX ORDER — EPINEPHRINE 0.3 MG/.3ML
0.3 INJECTION SUBCUTANEOUS ONCE
Qty: 1 EACH | Refills: 0 | Status: SHIPPED | OUTPATIENT
Start: 2024-09-19 | End: 2024-09-19

## 2024-09-19 RX ORDER — IBUPROFEN 800 MG/1
800 TABLET, FILM COATED ORAL EVERY 8 HOURS PRN
Qty: 270 TABLET | Refills: 1 | Status: SHIPPED | OUTPATIENT
Start: 2024-09-19

## 2024-09-19 RX ORDER — SILDENAFIL CITRATE 20 MG/1
TABLET ORAL
Qty: 40 TABLET | Refills: 2 | Status: SHIPPED | OUTPATIENT
Start: 2024-09-19

## 2024-09-19 RX ORDER — EZETIMIBE 10 MG/1
10 TABLET ORAL DAILY
Qty: 90 TABLET | Refills: 1 | Status: SHIPPED | OUTPATIENT
Start: 2024-09-19

## 2024-09-19 RX ORDER — RABEPRAZOLE SODIUM 20 MG/1
20 TABLET, DELAYED RELEASE ORAL DAILY PRN
Qty: 90 TABLET | Refills: 1 | Status: SHIPPED | OUTPATIENT
Start: 2024-09-19

## 2024-09-19 RX ORDER — FEXOFENADINE HCL 180 MG/1
180 TABLET ORAL DAILY
Qty: 90 TABLET | Refills: 1 | Status: SHIPPED | OUTPATIENT
Start: 2024-09-19

## 2024-09-19 RX ORDER — ALLOPURINOL 100 MG/1
100 TABLET ORAL DAILY
Qty: 90 TABLET | Refills: 1 | Status: SHIPPED | OUTPATIENT
Start: 2024-09-19

## 2024-10-18 ENCOUNTER — CLINICAL SUPPORT (OUTPATIENT)
Dept: FAMILY MEDICINE CLINIC | Facility: CLINIC | Age: 64
End: 2024-10-18
Payer: COMMERCIAL

## 2024-10-18 DIAGNOSIS — Z00.00 ENCOUNTER FOR ANNUAL PHYSICAL EXAM: ICD-10-CM

## 2024-10-18 DIAGNOSIS — R73.03 PRE-DIABETES: ICD-10-CM

## 2024-10-18 DIAGNOSIS — I10 ESSENTIAL HYPERTENSION: ICD-10-CM

## 2024-10-18 DIAGNOSIS — M1A.00X0 CHRONIC PRIMARY GOUT: ICD-10-CM

## 2024-10-18 DIAGNOSIS — E78.5 HYPERLIPIDEMIA, UNSPECIFIED HYPERLIPIDEMIA TYPE: ICD-10-CM

## 2024-10-18 LAB
ALBUMIN SERPL-MCNC: 4.1 G/DL (ref 3.5–5.2)
ALBUMIN UR-MCNC: <1.2 MG/DL
ALBUMIN/GLOB SERPL: 1.4 G/DL
ALP SERPL-CCNC: 99 U/L (ref 39–117)
ALT SERPL W P-5'-P-CCNC: 14 U/L (ref 1–41)
ANION GAP SERPL CALCULATED.3IONS-SCNC: 8.6 MMOL/L (ref 5–15)
AST SERPL-CCNC: 13 U/L (ref 1–40)
BASOPHILS # BLD AUTO: 0.16 10*3/MM3 (ref 0–0.2)
BASOPHILS NFR BLD AUTO: 2 % (ref 0–1.5)
BILIRUB SERPL-MCNC: 0.4 MG/DL (ref 0–1.2)
BUN SERPL-MCNC: 21 MG/DL (ref 8–23)
BUN/CREAT SERPL: 18.8 (ref 7–25)
CALCIUM SPEC-SCNC: 9.8 MG/DL (ref 8.6–10.5)
CHLORIDE SERPL-SCNC: 101 MMOL/L (ref 98–107)
CHOLEST SERPL-MCNC: 208 MG/DL (ref 0–200)
CO2 SERPL-SCNC: 26.4 MMOL/L (ref 22–29)
CREAT SERPL-MCNC: 1.12 MG/DL (ref 0.76–1.27)
CREAT UR-MCNC: 91.6 MG/DL
DEPRECATED RDW RBC AUTO: 39.7 FL (ref 37–54)
EGFRCR SERPLBLD CKD-EPI 2021: 73.4 ML/MIN/1.73
EOSINOPHIL # BLD AUTO: 0.27 10*3/MM3 (ref 0–0.4)
EOSINOPHIL NFR BLD AUTO: 3.4 % (ref 0.3–6.2)
ERYTHROCYTE [DISTWIDTH] IN BLOOD BY AUTOMATED COUNT: 12.2 % (ref 12.3–15.4)
GLOBULIN UR ELPH-MCNC: 2.9 GM/DL
GLUCOSE SERPL-MCNC: 102 MG/DL (ref 65–99)
HBA1C MFR BLD: 5.6 % (ref 4.8–5.6)
HCT VFR BLD AUTO: 44.4 % (ref 37.5–51)
HDLC SERPL-MCNC: 57 MG/DL (ref 40–60)
HGB BLD-MCNC: 14.7 G/DL (ref 13–17.7)
IMM GRANULOCYTES # BLD AUTO: 0.05 10*3/MM3 (ref 0–0.05)
IMM GRANULOCYTES NFR BLD AUTO: 0.6 % (ref 0–0.5)
LDLC SERPL CALC-MCNC: 128 MG/DL (ref 0–100)
LDLC/HDLC SERPL: 2.19 {RATIO}
LYMPHOCYTES # BLD AUTO: 2.67 10*3/MM3 (ref 0.7–3.1)
LYMPHOCYTES NFR BLD AUTO: 33.3 % (ref 19.6–45.3)
MCH RBC QN AUTO: 30.2 PG (ref 26.6–33)
MCHC RBC AUTO-ENTMCNC: 33.1 G/DL (ref 31.5–35.7)
MCV RBC AUTO: 91.2 FL (ref 79–97)
MICROALBUMIN/CREAT UR: NORMAL MG/G{CREAT}
MONOCYTES # BLD AUTO: 0.66 10*3/MM3 (ref 0.1–0.9)
MONOCYTES NFR BLD AUTO: 8.2 % (ref 5–12)
NEUTROPHILS NFR BLD AUTO: 4.22 10*3/MM3 (ref 1.7–7)
NEUTROPHILS NFR BLD AUTO: 52.5 % (ref 42.7–76)
NRBC BLD AUTO-RTO: 0 /100 WBC (ref 0–0.2)
PLATELET # BLD AUTO: 320 10*3/MM3 (ref 140–450)
PMV BLD AUTO: 9.7 FL (ref 6–12)
POTASSIUM SERPL-SCNC: 4.6 MMOL/L (ref 3.5–5.2)
PROT SERPL-MCNC: 7 G/DL (ref 6–8.5)
RBC # BLD AUTO: 4.87 10*6/MM3 (ref 4.14–5.8)
SODIUM SERPL-SCNC: 136 MMOL/L (ref 136–145)
T4 FREE SERPL-MCNC: 1.17 NG/DL (ref 0.92–1.68)
TRIGL SERPL-MCNC: 132 MG/DL (ref 0–150)
TSH SERPL DL<=0.05 MIU/L-ACNC: 2.33 UIU/ML (ref 0.27–4.2)
URATE SERPL-MCNC: 5.5 MG/DL (ref 3.4–7)
VLDLC SERPL-MCNC: 23 MG/DL (ref 5–40)
WBC NRBC COR # BLD AUTO: 8.03 10*3/MM3 (ref 3.4–10.8)

## 2024-10-18 PROCEDURE — 80061 LIPID PANEL: CPT | Performed by: NURSE PRACTITIONER

## 2024-10-18 PROCEDURE — 82043 UR ALBUMIN QUANTITATIVE: CPT | Performed by: NURSE PRACTITIONER

## 2024-10-18 PROCEDURE — 36415 COLL VENOUS BLD VENIPUNCTURE: CPT | Performed by: NURSE PRACTITIONER

## 2024-10-18 PROCEDURE — 80050 GENERAL HEALTH PANEL: CPT | Performed by: NURSE PRACTITIONER

## 2024-10-18 PROCEDURE — 83036 HEMOGLOBIN GLYCOSYLATED A1C: CPT | Performed by: NURSE PRACTITIONER

## 2024-10-18 PROCEDURE — 84439 ASSAY OF FREE THYROXINE: CPT | Performed by: NURSE PRACTITIONER

## 2024-10-18 PROCEDURE — 82570 ASSAY OF URINE CREATININE: CPT | Performed by: NURSE PRACTITIONER

## 2024-10-18 PROCEDURE — 84550 ASSAY OF BLOOD/URIC ACID: CPT | Performed by: NURSE PRACTITIONER

## 2024-10-18 NOTE — PROGRESS NOTES
..  Venipuncture Blood Specimen Collection  Venipuncture performed in LT arm by Cailin Singh MA with good hemostasis. Patient tolerated the procedure well without complications.   10/18/24   Cailin Singh MA

## 2025-03-17 ENCOUNTER — OFFICE VISIT (OUTPATIENT)
Dept: FAMILY MEDICINE CLINIC | Facility: CLINIC | Age: 65
End: 2025-03-17
Payer: COMMERCIAL

## 2025-03-17 VITALS
WEIGHT: 297 LBS | SYSTOLIC BLOOD PRESSURE: 138 MMHG | TEMPERATURE: 98.2 F | HEART RATE: 79 BPM | BODY MASS INDEX: 44.5 KG/M2 | DIASTOLIC BLOOD PRESSURE: 82 MMHG | OXYGEN SATURATION: 100 %

## 2025-03-17 DIAGNOSIS — I10 ESSENTIAL HYPERTENSION: Primary | ICD-10-CM

## 2025-03-17 DIAGNOSIS — E78.5 HYPERLIPIDEMIA, UNSPECIFIED HYPERLIPIDEMIA TYPE: ICD-10-CM

## 2025-03-17 DIAGNOSIS — G89.29 CHRONIC PAIN OF RIGHT KNEE: ICD-10-CM

## 2025-03-17 DIAGNOSIS — Z12.5 PROSTATE CANCER SCREENING: ICD-10-CM

## 2025-03-17 DIAGNOSIS — K44.9 HIATAL HERNIA: ICD-10-CM

## 2025-03-17 DIAGNOSIS — N52.9 ERECTILE DYSFUNCTION, UNSPECIFIED ERECTILE DYSFUNCTION TYPE: ICD-10-CM

## 2025-03-17 DIAGNOSIS — K21.9 GASTROESOPHAGEAL REFLUX DISEASE WITHOUT ESOPHAGITIS: ICD-10-CM

## 2025-03-17 DIAGNOSIS — E79.0 HYPERURICEMIA WITHOUT SIGNS INFLAMMATORY ARTHRITIS/TOPHACEOUS DISEASE: ICD-10-CM

## 2025-03-17 DIAGNOSIS — M25.561 CHRONIC PAIN OF RIGHT KNEE: ICD-10-CM

## 2025-03-17 DIAGNOSIS — Z13.1 SCREENING FOR DIABETES MELLITUS: ICD-10-CM

## 2025-03-17 DIAGNOSIS — R09.89 CHRONIC SINUS COMPLAINTS: ICD-10-CM

## 2025-03-17 PROCEDURE — 99214 OFFICE O/P EST MOD 30 MIN: CPT | Performed by: NURSE PRACTITIONER

## 2025-03-17 RX ORDER — ALLOPURINOL 100 MG/1
100 TABLET ORAL DAILY
Qty: 90 TABLET | Refills: 1 | Status: SHIPPED | OUTPATIENT
Start: 2025-03-17

## 2025-03-17 RX ORDER — EZETIMIBE 10 MG/1
10 TABLET ORAL DAILY
Qty: 90 TABLET | Refills: 1 | Status: SHIPPED | OUTPATIENT
Start: 2025-03-17

## 2025-03-17 RX ORDER — FEXOFENADINE HCL 180 MG/1
180 TABLET ORAL DAILY
Qty: 90 TABLET | Refills: 1 | Status: SHIPPED | OUTPATIENT
Start: 2025-03-17

## 2025-03-17 RX ORDER — LISINOPRIL 5 MG/1
TABLET ORAL
Qty: 180 TABLET | Refills: 1 | Status: SHIPPED | OUTPATIENT
Start: 2025-03-17

## 2025-03-17 RX ORDER — RABEPRAZOLE SODIUM 20 MG/1
20 TABLET, DELAYED RELEASE ORAL DAILY PRN
Qty: 90 TABLET | Refills: 1 | Status: SHIPPED | OUTPATIENT
Start: 2025-03-17

## 2025-03-17 RX ORDER — SILDENAFIL CITRATE 20 MG/1
TABLET ORAL
Qty: 40 TABLET | Refills: 2 | Status: SHIPPED | OUTPATIENT
Start: 2025-03-17

## 2025-03-17 RX ORDER — IBUPROFEN 800 MG/1
800 TABLET, FILM COATED ORAL EVERY 8 HOURS PRN
Qty: 270 TABLET | Refills: 1 | Status: SHIPPED | OUTPATIENT
Start: 2025-03-17

## 2025-03-17 NOTE — PROGRESS NOTES
Chief Complaint  Hypertension (Med refills/labs )      Symptoms are: chronic.   Onset was more than 5 years.   Symptoms occur: weekly.  Other symptom:  Check up for meds  Treatment and/or Medications comments include: ibuprofen     Kip Oliveira is a 64 y.o. male who presents to [facility] with a past medical history of    Past Medical History:   Diagnosis Date    GERD (gastroesophageal reflux disease)     Hyperlipidemia     Palpitations        History of Present Illness  The patient is a 64-year-old male who presents to the office today for follow-up on chronic health conditions and medication refills.    He has a history of gout, with the last known attack occurring approximately 40 years ago, affecting his big toe. He has been on allopurinol since then and has not experienced any further attacks.    He is currently on a daily regimen of Allegra for chronic sinus issues, which he reports as effective. He adjusts the dosage based on symptom severity, taking half a tablet for mild symptoms and a full tablet for more severe symptoms.    He is also on sildenafil 20 mg, which he reports as effective. He typically receives a prescription for 40 tablets with 2 refills. He notes that his insurance does not cover this medication.  His out-of-pocket expense is approximately $80.    He is on lisinopril 10 mg, of which he takes half a tablet daily, as needed, for blood pressure management.  Blood pressure is normotensive on exam today, 138/82.  He denies chest pain, headaches, dizziness, shortness of breath, or lower extremity edema.  He does experience palpitations when eating something spicy.    He is on Aciphex for reflux, which he takes once daily. He reports that apple cider vinegar and oregano oil have been beneficial for his reflux symptoms.    He is on Zetia for cholesterol management.    Supplemental Information  He takes ibuprofen once a day for his knee.    MEDICATIONS  Ibuprofen,  allopurinol, Allegra, sildenafil, lisinopril, Aciphex, Zetia      Objective   Vital Signs:   Vitals:    03/17/25 1037   BP: 138/82   Pulse: 79   Temp: 98.2 °F (36.8 °C)   SpO2: 100%   Weight: 135 kg (297 lb)     Body mass index is 44.5 kg/m².    Wt Readings from Last 3 Encounters:   03/17/25 135 kg (297 lb)   09/19/24 133 kg (293 lb)   07/02/24 130 kg (287 lb)     BP Readings from Last 3 Encounters:   03/17/25 138/82   09/19/24 142/80   07/02/24 130/72       Health Maintenance   Topic Date Due    Pneumococcal Vaccine 50+ (1 of 1 - PCV) 03/17/2026 (Originally 5/15/2010)    ANNUAL PHYSICAL  09/19/2025    BMI FOLLOWUP  09/19/2025    LIPID PANEL  10/18/2025    COLORECTAL CANCER SCREENING  11/07/2027    TDAP/TD VACCINES (2 - Td or Tdap) 01/01/2028    HEPATITIS C SCREENING  Completed    COVID-19 Vaccine  Discontinued    INFLUENZA VACCINE  Discontinued    ZOSTER VACCINE  Discontinued       Physical Exam  Vitals reviewed.   Constitutional:       General: He is not in acute distress.     Appearance: He is well-developed. He is morbidly obese. He is not ill-appearing.   HENT:      Head: Normocephalic and atraumatic.   Eyes:      General: No scleral icterus.        Right eye: No discharge.         Left eye: No discharge.      Extraocular Movements: Extraocular movements intact.      Conjunctiva/sclera: Conjunctivae normal.   Neck:      Thyroid: No thyromegaly.      Vascular: No carotid bruit.      Trachea: Trachea normal.   Cardiovascular:      Rate and Rhythm: Normal rate and regular rhythm.      Pulses: Normal pulses.      Heart sounds: No murmur heard.  Pulmonary:      Effort: Pulmonary effort is normal.      Breath sounds: Normal breath sounds. No wheezing, rhonchi or rales.   Musculoskeletal:         General: Normal range of motion.      Cervical back: Normal range of motion and neck supple. No tenderness.      Right lower leg: No edema.      Left lower leg: No edema.   Lymphadenopathy:      Cervical: No cervical  adenopathy.   Skin:     General: Skin is warm and dry.   Neurological:      Mental Status: He is alert and oriented to person, place, and time.   Psychiatric:         Mood and Affect: Mood and affect normal.         Behavior: Behavior normal.         Thought Content: Thought content normal.         Judgment: Judgment normal.            Result Review :  The following data was reviewed by: GABI Echeverria on 03/17/2025:    No visits with results within 1 Month(s) from this visit.   Latest known visit with results is:   Results Encounter on 10/28/2024   Component Date Value    Cologuard 11/07/2024 Negative      Common labs          3/28/2024    08:03 10/18/2024    08:09   Common Labs   Glucose 97  102    BUN 19  21    Creatinine 1.13  1.12    Sodium 138  136    Potassium 4.4  4.6    Chloride 101  101    Calcium 9.6  9.8    Albumin 4.2  4.1    Total Bilirubin 0.5  0.4    Alkaline Phosphatase 100  99    AST (SGOT) 17  13    ALT (SGPT) 17  14    WBC 7.80  8.03    Hemoglobin 14.8  14.7    Hematocrit 44.1  44.4    Platelets 317  320    Total Cholesterol 206  208    Triglycerides 109  132    HDL Cholesterol 62  57    LDL Cholesterol  125  128    Hemoglobin A1C 5.60  5.60    Microalbumin, Urine <1.2  <1.2    PSA 0.507     Uric Acid  5.5      UPPER GI ENDOSCOPY (11/16/2022 09:09)     Procedures        Assessment and Plan   Diagnoses and all orders for this visit:    1. Essential hypertension (Primary)  -     lisinopril (PRINIVIL,ZESTRIL) 5 MG tablet; TAKE 1-2 TABLETS BY MOUTH DAILY AS NEEDED FOR BLOOD PRESSURE >135/85  Dispense: 180 tablet; Refill: 1  -     CBC Auto Differential; Future  -     Comprehensive Metabolic Panel; Future  -     Lipid Panel; Future  -     TSH+Free T4; Future  -     Microalbumin / Creatinine Urine Ratio - Urine, Clean Catch; Future    2. Hyperlipidemia, unspecified hyperlipidemia type  -     ezetimibe (Zetia) 10 MG tablet; Take 1 tablet by mouth Daily.  Dispense: 90 tablet; Refill: 1  -      Comprehensive Metabolic Panel; Future  -     Lipid Panel; Future    3. Gastroesophageal reflux disease without esophagitis  Overview:  Added automatically from request for surgery [id number]    Orders:  -     RABEprazole (ACIPHEX) 20 MG EC tablet; Take 1 tablet by mouth Daily As Needed (heartburn/indigestion).  Dispense: 90 tablet; Refill: 1    4. Hiatal hernia  -     RABEprazole (ACIPHEX) 20 MG EC tablet; Take 1 tablet by mouth Daily As Needed (heartburn/indigestion).  Dispense: 90 tablet; Refill: 1    5. Erectile dysfunction, unspecified erectile dysfunction type  -     sildenafil (REVATIO) 20 MG tablet; Take one tablet by mouth PRN prior to sexual activity  Dispense: 40 tablet; Refill: 2    6. Hyperuricemia without signs inflammatory arthritis/tophaceous disease  -     allopurinol (ZYLOPRIM) 100 MG tablet; Take 1 tablet by mouth Daily.  Dispense: 90 tablet; Refill: 1  -     Uric acid; Future    7. Chronic pain of right knee  -     ibuprofen (ADVIL,MOTRIN) 800 MG tablet; Take 1 tablet by mouth Every 8 (Eight) Hours As Needed for Mild Pain.  Dispense: 270 tablet; Refill: 1    8. Chronic sinus complaints  -     fexofenadine (Allegra Allergy) 180 MG tablet; Take 1 tablet by mouth Daily.  Dispense: 90 tablet; Refill: 1    9. Screening for diabetes mellitus  -     Hemoglobin A1c; Future    10. Prostate cancer screening  -     PSA Screen; Future        Assessment & Plan  1. Hyperuricemia without signs of inflammatory arthritis or disease.  The diagnosis has been updated from gout to hyperuricemia without signs of inflammatory arthritis or disease. He has not experienced a gout attack since starting allopurinol.    2. Chronic sinusitis.  He continues to take Allegra daily, adjusting the dose based on symptom severity.    3. Erectile dysfunction.  He is currently on sildenafil 20 mg, which is working fine. He receives a prescription for 40 tablets with 2 refills.    4. Hypertension.  He is on lisinopril 10 mg, taking half a  tablet daily as needed for blood pressure greater than 130.    5. Gastroesophageal reflux disease (GERD).  He takes Aciphex once daily for reflux. He also uses apple cider vinegar and oregano oil, which he finds helpful.    6. Hypercholesterolemia.  He is on Zetia for cholesterol management.    7. Health maintenance.  He is scheduled for prostate screening labs on 03/29/2025. All other medications will remain unchanged.    Follow-up  The patient will follow up in September for a Welcome to Medicare visit.    PROCEDURE  EGD was performed in 2022.                FOLLOW UP  Return in about 27 weeks (around 9/22/2025) for Welcome to Medicare Wellness, medication refills and fasting labs.    Patient was given instructions and counseling regarding his condition or for health maintenance advice. Please see specific information pulled into the AVS if appropriate.       Connie Castaneda, APRN  03/17/25  11:12 EDT    CURRENT & DISCONTINUED MEDICATIONS  Current Outpatient Medications   Medication Instructions    allopurinol (ZYLOPRIM) 100 mg, Oral, Daily    ezetimibe (ZETIA) 10 mg, Oral, Daily    fexofenadine (ALLEGRA ALLERGY) 180 mg, Oral, Daily    ibuprofen (ADVIL,MOTRIN) 800 mg, Oral, Every 8 Hours PRN    lisinopril (PRINIVIL,ZESTRIL) 5 MG tablet TAKE 1-2 TABLETS BY MOUTH DAILY AS NEEDED FOR BLOOD PRESSURE >135/85    RABEprazole (ACIPHEX) 20 mg, Oral, Daily PRN    sildenafil (REVATIO) 20 MG tablet Take one tablet by mouth PRN prior to sexual activity       Medications Discontinued During This Encounter   Medication Reason    allopurinol (ZYLOPRIM) 100 MG tablet Reorder    ezetimibe (Zetia) 10 MG tablet Reorder    fexofenadine (Allegra Allergy) 180 MG tablet Reorder    ibuprofen (ADVIL,MOTRIN) 800 MG tablet Reorder    lisinopril (PRINIVIL,ZESTRIL) 5 MG tablet Reorder    sildenafil (REVATIO) 20 MG tablet Reorder    RABEprazole (ACIPHEX) 20 MG EC tablet Reorder        Patient or patient representative verbalized consent for  the use of Ambient Listening during the visit with  GABI Echeverria for chart documentation. 3/17/2025  10:43 EDT

## 2025-04-11 ENCOUNTER — CLINICAL SUPPORT (OUTPATIENT)
Dept: FAMILY MEDICINE CLINIC | Facility: CLINIC | Age: 65
End: 2025-04-11
Payer: COMMERCIAL

## 2025-04-11 DIAGNOSIS — Z12.5 PROSTATE CANCER SCREENING: ICD-10-CM

## 2025-04-11 DIAGNOSIS — E79.0 HYPERURICEMIA WITHOUT SIGNS INFLAMMATORY ARTHRITIS/TOPHACEOUS DISEASE: ICD-10-CM

## 2025-04-11 DIAGNOSIS — I10 ESSENTIAL HYPERTENSION: ICD-10-CM

## 2025-04-11 DIAGNOSIS — Z13.1 SCREENING FOR DIABETES MELLITUS: ICD-10-CM

## 2025-04-11 DIAGNOSIS — E78.5 HYPERLIPIDEMIA, UNSPECIFIED HYPERLIPIDEMIA TYPE: ICD-10-CM

## 2025-04-11 LAB
ALBUMIN SERPL-MCNC: 4.1 G/DL (ref 3.5–5.2)
ALBUMIN UR-MCNC: <1.2 MG/DL
ALBUMIN/GLOB SERPL: 1.3 G/DL
ALP SERPL-CCNC: 107 U/L (ref 39–117)
ALT SERPL W P-5'-P-CCNC: 15 U/L (ref 1–41)
ANION GAP SERPL CALCULATED.3IONS-SCNC: 12.4 MMOL/L (ref 5–15)
AST SERPL-CCNC: 18 U/L (ref 1–40)
BASOPHILS # BLD AUTO: 0.13 10*3/MM3 (ref 0–0.2)
BASOPHILS NFR BLD AUTO: 1.6 % (ref 0–1.5)
BILIRUB SERPL-MCNC: 0.3 MG/DL (ref 0–1.2)
BUN SERPL-MCNC: 17 MG/DL (ref 8–23)
BUN/CREAT SERPL: 14.5 (ref 7–25)
CALCIUM SPEC-SCNC: 9.7 MG/DL (ref 8.6–10.5)
CHLORIDE SERPL-SCNC: 100 MMOL/L (ref 98–107)
CHOLEST SERPL-MCNC: 215 MG/DL (ref 0–200)
CO2 SERPL-SCNC: 25.6 MMOL/L (ref 22–29)
CREAT SERPL-MCNC: 1.17 MG/DL (ref 0.76–1.27)
CREAT UR-MCNC: 107.1 MG/DL
DEPRECATED RDW RBC AUTO: 41.7 FL (ref 37–54)
EGFRCR SERPLBLD CKD-EPI 2021: 69.6 ML/MIN/1.73
EOSINOPHIL # BLD AUTO: 0.28 10*3/MM3 (ref 0–0.4)
EOSINOPHIL NFR BLD AUTO: 3.5 % (ref 0.3–6.2)
ERYTHROCYTE [DISTWIDTH] IN BLOOD BY AUTOMATED COUNT: 12.6 % (ref 12.3–15.4)
GLOBULIN UR ELPH-MCNC: 3.1 GM/DL
GLUCOSE SERPL-MCNC: 96 MG/DL (ref 65–99)
HBA1C MFR BLD: 5.7 % (ref 4.8–5.6)
HCT VFR BLD AUTO: 45.1 % (ref 37.5–51)
HDLC SERPL-MCNC: 60 MG/DL (ref 40–60)
HGB BLD-MCNC: 14.6 G/DL (ref 13–17.7)
IMM GRANULOCYTES # BLD AUTO: 0.04 10*3/MM3 (ref 0–0.05)
IMM GRANULOCYTES NFR BLD AUTO: 0.5 % (ref 0–0.5)
LDLC SERPL CALC-MCNC: 131 MG/DL (ref 0–100)
LDLC/HDLC SERPL: 2.14 {RATIO}
LYMPHOCYTES # BLD AUTO: 2.42 10*3/MM3 (ref 0.7–3.1)
LYMPHOCYTES NFR BLD AUTO: 30.1 % (ref 19.6–45.3)
MCH RBC QN AUTO: 29.6 PG (ref 26.6–33)
MCHC RBC AUTO-ENTMCNC: 32.4 G/DL (ref 31.5–35.7)
MCV RBC AUTO: 91.5 FL (ref 79–97)
MICROALBUMIN/CREAT UR: NORMAL MG/G{CREAT}
MONOCYTES # BLD AUTO: 0.63 10*3/MM3 (ref 0.1–0.9)
MONOCYTES NFR BLD AUTO: 7.8 % (ref 5–12)
NEUTROPHILS NFR BLD AUTO: 4.53 10*3/MM3 (ref 1.7–7)
NEUTROPHILS NFR BLD AUTO: 56.5 % (ref 42.7–76)
NRBC BLD AUTO-RTO: 0 /100 WBC (ref 0–0.2)
PLATELET # BLD AUTO: 299 10*3/MM3 (ref 140–450)
PMV BLD AUTO: 9.9 FL (ref 6–12)
POTASSIUM SERPL-SCNC: 4.5 MMOL/L (ref 3.5–5.2)
PROT SERPL-MCNC: 7.2 G/DL (ref 6–8.5)
PSA SERPL-MCNC: 0.65 NG/ML (ref 0–4)
RBC # BLD AUTO: 4.93 10*6/MM3 (ref 4.14–5.8)
SODIUM SERPL-SCNC: 138 MMOL/L (ref 136–145)
T4 FREE SERPL-MCNC: 1.2 NG/DL (ref 0.92–1.68)
TRIGL SERPL-MCNC: 134 MG/DL (ref 0–150)
TSH SERPL DL<=0.05 MIU/L-ACNC: 2.08 UIU/ML (ref 0.27–4.2)
URATE SERPL-MCNC: 7.2 MG/DL (ref 3.4–7)
VLDLC SERPL-MCNC: 24 MG/DL (ref 5–40)
WBC NRBC COR # BLD AUTO: 8.03 10*3/MM3 (ref 3.4–10.8)

## 2025-04-11 PROCEDURE — 82043 UR ALBUMIN QUANTITATIVE: CPT | Performed by: NURSE PRACTITIONER

## 2025-04-11 PROCEDURE — 83036 HEMOGLOBIN GLYCOSYLATED A1C: CPT | Performed by: NURSE PRACTITIONER

## 2025-04-11 PROCEDURE — 80061 LIPID PANEL: CPT | Performed by: NURSE PRACTITIONER

## 2025-04-11 PROCEDURE — 82570 ASSAY OF URINE CREATININE: CPT | Performed by: NURSE PRACTITIONER

## 2025-04-11 PROCEDURE — 84439 ASSAY OF FREE THYROXINE: CPT | Performed by: NURSE PRACTITIONER

## 2025-04-11 PROCEDURE — 36415 COLL VENOUS BLD VENIPUNCTURE: CPT | Performed by: NURSE PRACTITIONER

## 2025-04-11 PROCEDURE — 84550 ASSAY OF BLOOD/URIC ACID: CPT | Performed by: NURSE PRACTITIONER

## 2025-04-11 PROCEDURE — G0103 PSA SCREENING: HCPCS | Performed by: NURSE PRACTITIONER

## 2025-04-11 PROCEDURE — 80050 GENERAL HEALTH PANEL: CPT | Performed by: NURSE PRACTITIONER

## 2025-04-11 NOTE — PROGRESS NOTES
..  Venipuncture Blood Specimen Collection  Venipuncture performed in LT arm by Cailin Singh MA with good hemostasis. Patient tolerated the procedure well without complications.   04/11/25   Cailin Singh MA

## 2025-08-26 DIAGNOSIS — N52.9 ERECTILE DYSFUNCTION, UNSPECIFIED ERECTILE DYSFUNCTION TYPE: ICD-10-CM

## 2025-08-26 RX ORDER — SILDENAFIL CITRATE 20 MG/1
TABLET ORAL
Qty: 40 TABLET | Refills: 2 | Status: SHIPPED | OUTPATIENT
Start: 2025-08-26

## (undated) DEVICE — SINGLE-USE BIOPSY FORCEPS: Brand: RADIAL JAW 4

## (undated) DEVICE — VIAL FORMLN CAP 10PCT 20ML

## (undated) DEVICE — Device

## (undated) DEVICE — BITEBLOCK OMNI BLOC

## (undated) DEVICE — MSK ENDO PORT O2 POM ELITE CURAPLEX A/

## (undated) DEVICE — KT ORCA ORCAPOD DISP STRL

## (undated) DEVICE — GOWN ,SIRUS,NONREINFORCED 3XL: Brand: MEDLINE